# Patient Record
Sex: FEMALE | Race: WHITE | NOT HISPANIC OR LATINO
[De-identification: names, ages, dates, MRNs, and addresses within clinical notes are randomized per-mention and may not be internally consistent; named-entity substitution may affect disease eponyms.]

---

## 2019-01-08 ENCOUNTER — APPOINTMENT (OUTPATIENT)
Dept: SPINE | Facility: CLINIC | Age: 19
End: 2019-01-08
Payer: COMMERCIAL

## 2019-01-08 VITALS — HEIGHT: 61 IN | BODY MASS INDEX: 39.27 KG/M2 | WEIGHT: 208 LBS

## 2019-01-08 DIAGNOSIS — Z80.3 FAMILY HISTORY OF MALIGNANT NEOPLASM OF BREAST: ICD-10-CM

## 2019-01-08 PROCEDURE — 99212 OFFICE O/P EST SF 10 MIN: CPT

## 2019-01-08 RX ORDER — LURASIDONE HYDROCHLORIDE 20 MG/1
20 TABLET, FILM COATED ORAL
Refills: 0 | Status: ACTIVE | COMMUNITY

## 2019-01-18 ENCOUNTER — OUTPATIENT (OUTPATIENT)
Dept: OUTPATIENT SERVICES | Facility: HOSPITAL | Age: 19
LOS: 1 days | End: 2019-01-18
Payer: COMMERCIAL

## 2019-01-18 VITALS
TEMPERATURE: 97 F | WEIGHT: 195.11 LBS | HEART RATE: 95 BPM | OXYGEN SATURATION: 97 % | DIASTOLIC BLOOD PRESSURE: 80 MMHG | HEIGHT: 61.25 IN | RESPIRATION RATE: 16 BRPM | SYSTOLIC BLOOD PRESSURE: 113 MMHG

## 2019-01-18 DIAGNOSIS — Z98.89 OTHER SPECIFIED POSTPROCEDURAL STATES: Chronic | ICD-10-CM

## 2019-01-18 DIAGNOSIS — G90.529 COMPLEX REGIONAL PAIN SYNDROME I OF UNSPECIFIED LOWER LIMB: ICD-10-CM

## 2019-01-18 DIAGNOSIS — G90.522 COMPLEX REGIONAL PAIN SYNDROME I OF LEFT LOWER LIMB: ICD-10-CM

## 2019-01-18 DIAGNOSIS — F84.5 ASPERGER'S SYNDROME: ICD-10-CM

## 2019-01-18 DIAGNOSIS — K21.9 GASTRO-ESOPHAGEAL REFLUX DISEASE WITHOUT ESOPHAGITIS: ICD-10-CM

## 2019-01-18 DIAGNOSIS — Z01.818 ENCOUNTER FOR OTHER PREPROCEDURAL EXAMINATION: ICD-10-CM

## 2019-01-18 DIAGNOSIS — Z90.49 ACQUIRED ABSENCE OF OTHER SPECIFIED PARTS OF DIGESTIVE TRACT: Chronic | ICD-10-CM

## 2019-01-18 LAB
ANION GAP SERPL CALC-SCNC: 17 MMOL/L — SIGNIFICANT CHANGE UP (ref 5–17)
BUN SERPL-MCNC: 10 MG/DL — SIGNIFICANT CHANGE UP (ref 7–23)
CALCIUM SERPL-MCNC: 9.8 MG/DL — SIGNIFICANT CHANGE UP (ref 8.4–10.5)
CHLORIDE SERPL-SCNC: 98 MMOL/L — SIGNIFICANT CHANGE UP (ref 96–108)
CO2 SERPL-SCNC: 21 MMOL/L — LOW (ref 22–31)
CREAT SERPL-MCNC: 0.76 MG/DL — SIGNIFICANT CHANGE UP (ref 0.5–1.3)
GLUCOSE SERPL-MCNC: 73 MG/DL — SIGNIFICANT CHANGE UP (ref 70–99)
HCT VFR BLD CALC: 38.8 % — SIGNIFICANT CHANGE UP (ref 34.5–45)
HGB BLD-MCNC: 12.3 G/DL — SIGNIFICANT CHANGE UP (ref 11.5–15.5)
MCHC RBC-ENTMCNC: 26.5 PG — LOW (ref 27–34)
MCHC RBC-ENTMCNC: 31.7 GM/DL — LOW (ref 32–36)
MCV RBC AUTO: 83.6 FL — SIGNIFICANT CHANGE UP (ref 80–100)
PLATELET # BLD AUTO: 451 K/UL — HIGH (ref 150–400)
POTASSIUM SERPL-MCNC: 4.1 MMOL/L — SIGNIFICANT CHANGE UP (ref 3.5–5.3)
POTASSIUM SERPL-SCNC: 4.1 MMOL/L — SIGNIFICANT CHANGE UP (ref 3.5–5.3)
RBC # BLD: 4.64 M/UL — SIGNIFICANT CHANGE UP (ref 3.8–5.2)
RBC # FLD: 14.6 % — HIGH (ref 10.3–14.5)
SODIUM SERPL-SCNC: 136 MMOL/L — SIGNIFICANT CHANGE UP (ref 135–145)
WBC # BLD: 14.89 K/UL — HIGH (ref 3.8–10.5)
WBC # FLD AUTO: 14.89 K/UL — HIGH (ref 3.8–10.5)

## 2019-01-18 PROCEDURE — 80048 BASIC METABOLIC PNL TOTAL CA: CPT

## 2019-01-18 PROCEDURE — 85027 COMPLETE CBC AUTOMATED: CPT

## 2019-01-18 PROCEDURE — G0463: CPT

## 2019-01-18 RX ORDER — SODIUM CHLORIDE 9 MG/ML
3 INJECTION INTRAMUSCULAR; INTRAVENOUS; SUBCUTANEOUS EVERY 8 HOURS
Qty: 0 | Refills: 0 | Status: DISCONTINUED | OUTPATIENT
Start: 2019-01-25 | End: 2019-01-25

## 2019-01-18 RX ORDER — CEFAZOLIN SODIUM 1 G
2000 VIAL (EA) INJECTION ONCE
Qty: 0 | Refills: 0 | Status: DISCONTINUED | OUTPATIENT
Start: 2019-01-25 | End: 2019-02-09

## 2019-01-18 RX ORDER — LIDOCAINE HCL 20 MG/ML
0.2 VIAL (ML) INJECTION ONCE
Qty: 0 | Refills: 0 | Status: DISCONTINUED | OUTPATIENT
Start: 2019-01-25 | End: 2019-01-25

## 2019-01-18 NOTE — H&P PST ADULT - PSH
S/P foot surgery  LEFT  S/P insertion of spinal cord stimulator  2015  S/P tonsillectomy and adenoidectomy

## 2019-01-18 NOTE — H&P PST ADULT - PMH
ADD (attention deficit disorder)    ADHD (Attention Deficit Hyperactivity Disorder)    Ankle fracture  congenital  left ankle  staller Walden fracture  Asperger's syndrome    Crohn disease    Depression    Neuroma    OCD (Obsessive Compulsive Disorder)    RSD lower limb, left

## 2019-01-18 NOTE — H&P PST ADULT - HEALTH CARE MAINTENANCE
Has not received flu vaccine 2018-19 season  endoscopy/colonoscopy twice a year 2/2 chron's Has not received flu vaccine 2018-19 season  endoscopy/colonoscopy twice a year 2/2 chron' s

## 2019-01-18 NOTE — H&P PST ADULT - PROBLEM SELECTOR PLAN 1
Spinal Stimulator Battery Replacement  Pre-op education, including Chlorhexidine soap, provided - all questions answered

## 2019-01-18 NOTE — H&P PST ADULT - NEGATIVE NEUROLOGICAL SYMPTOMS
no loss of sensation/no difficulty walking/no confusion/no headache/no vertigo no syncope/no vertigo/no loss of sensation/no confusion/no difficulty walking/no tremors/no headache/no generalized seizures

## 2019-01-18 NOTE — H&P PST ADULT - HISTORY OF PRESENT ILLNESS
CRPS's. 2015, replacement 2016, first battery change 17 yo female, PMH Asperger's syndrome, OCD, ADD, Lupus, PCOS, Chron' s disease, CRPS with chronic left and right  foot and ankle pain s/p spinal cord stimulator placement 2016, requiring revision 2016, she has been doing well with pain relief but having some trouble recharging battery and returns to PST for Spinal Stimulator Battery replacement on 1/25/19. Pt. denies recent fever, chills, chest pain, SOB, changes in bowel/urinary habits.

## 2019-01-18 NOTE — H&P PST ADULT - NSANTHOSAYNRD_GEN_A_CORE
No. TOR screening performed.  STOP BANG Legend: 0-2 = LOW Risk; 3-4 = INTERMEDIATE Risk; 5-8 = HIGH Risk

## 2019-01-18 NOTE — H&P PST ADULT - NEGATIVE ENMT SYMPTOMS
no ear pain/no nasal congestion/no nasal obstruction/no post-nasal discharge/no tinnitus/no vertigo/no nasal discharge

## 2019-01-24 ENCOUNTER — TRANSCRIPTION ENCOUNTER (OUTPATIENT)
Age: 19
End: 2019-01-24

## 2019-01-25 ENCOUNTER — OUTPATIENT (OUTPATIENT)
Dept: OUTPATIENT SERVICES | Facility: HOSPITAL | Age: 19
LOS: 1 days | End: 2019-01-25
Payer: COMMERCIAL

## 2019-01-25 ENCOUNTER — APPOINTMENT (OUTPATIENT)
Dept: SPINE | Facility: HOSPITAL | Age: 19
End: 2019-01-25

## 2019-01-25 VITALS
WEIGHT: 207.01 LBS | OXYGEN SATURATION: 96 % | HEIGHT: 61 IN | RESPIRATION RATE: 16 BRPM | HEART RATE: 92 BPM | SYSTOLIC BLOOD PRESSURE: 120 MMHG | TEMPERATURE: 98 F | DIASTOLIC BLOOD PRESSURE: 81 MMHG

## 2019-01-25 VITALS
HEART RATE: 94 BPM | OXYGEN SATURATION: 94 % | SYSTOLIC BLOOD PRESSURE: 121 MMHG | DIASTOLIC BLOOD PRESSURE: 74 MMHG | RESPIRATION RATE: 16 BRPM

## 2019-01-25 DIAGNOSIS — Z98.89 OTHER SPECIFIED POSTPROCEDURAL STATES: Chronic | ICD-10-CM

## 2019-01-25 DIAGNOSIS — G90.522 COMPLEX REGIONAL PAIN SYNDROME I OF LEFT LOWER LIMB: ICD-10-CM

## 2019-01-25 DIAGNOSIS — Z90.49 ACQUIRED ABSENCE OF OTHER SPECIFIED PARTS OF DIGESTIVE TRACT: Chronic | ICD-10-CM

## 2019-01-25 DIAGNOSIS — Z01.818 ENCOUNTER FOR OTHER PREPROCEDURAL EXAMINATION: ICD-10-CM

## 2019-01-25 LAB — HCG UR QL: NEGATIVE — SIGNIFICANT CHANGE UP

## 2019-01-25 PROCEDURE — C1787: CPT

## 2019-01-25 PROCEDURE — 81025 URINE PREGNANCY TEST: CPT

## 2019-01-25 PROCEDURE — C1820: CPT

## 2019-01-25 PROCEDURE — 95972 ALYS CPLX SP/PN NPGT W/PRGRM: CPT | Mod: 59

## 2019-01-25 PROCEDURE — 63685 INS/RPLC SPI NPG/RCVR POCKET: CPT

## 2019-01-25 PROCEDURE — 63688 REV/RMV IMP SP NPG/R DTCH CN: CPT

## 2019-01-25 RX ORDER — CEPHALEXIN 500 MG
1 CAPSULE ORAL
Qty: 1 | Refills: 0
Start: 2019-01-25

## 2019-01-25 RX ORDER — OXYCODONE AND ACETAMINOPHEN 5; 325 MG/1; MG/1
1 TABLET ORAL EVERY 4 HOURS
Qty: 0 | Refills: 0 | Status: DISCONTINUED | OUTPATIENT
Start: 2019-01-25 | End: 2019-01-25

## 2019-01-25 RX ORDER — OXYCODONE HYDROCHLORIDE 5 MG/1
1 TABLET ORAL
Qty: 0 | Refills: 0 | DISCHARGE
Start: 2019-01-25 | End: 2019-01-28

## 2019-01-25 RX ORDER — DEXTROSE MONOHYDRATE, SODIUM CHLORIDE, AND POTASSIUM CHLORIDE 50; .745; 4.5 G/1000ML; G/1000ML; G/1000ML
1000 INJECTION, SOLUTION INTRAVENOUS
Qty: 0 | Refills: 0 | Status: DISCONTINUED | OUTPATIENT
Start: 2019-01-25 | End: 2019-02-09

## 2019-01-25 RX ORDER — ACETAMINOPHEN 500 MG
650 TABLET ORAL EVERY 6 HOURS
Qty: 0 | Refills: 0 | Status: DISCONTINUED | OUTPATIENT
Start: 2019-01-25 | End: 2019-02-09

## 2019-01-25 RX ORDER — OXYCODONE HYDROCHLORIDE 5 MG/1
1 TABLET ORAL
Qty: 16 | Refills: 0
Start: 2019-01-25 | End: 2019-01-28

## 2019-01-25 RX ORDER — OXYCODONE HYDROCHLORIDE 5 MG/1
2 TABLET ORAL
Qty: 0 | Refills: 0 | DISCHARGE
Start: 2019-01-25 | End: 2019-01-28

## 2019-01-25 RX ORDER — CEFAZOLIN SODIUM 1 G
2000 VIAL (EA) INJECTION EVERY 8 HOURS
Qty: 0 | Refills: 0 | Status: DISCONTINUED | OUTPATIENT
Start: 2019-01-25 | End: 2019-01-25

## 2019-01-25 RX ORDER — ONDANSETRON 8 MG/1
4 TABLET, FILM COATED ORAL ONCE
Qty: 0 | Refills: 0 | Status: DISCONTINUED | OUTPATIENT
Start: 2019-01-25 | End: 2019-01-25

## 2019-01-25 RX ORDER — OXYCODONE AND ACETAMINOPHEN 5; 325 MG/1; MG/1
2 TABLET ORAL EVERY 6 HOURS
Qty: 0 | Refills: 0 | Status: DISCONTINUED | OUTPATIENT
Start: 2019-01-25 | End: 2019-01-25

## 2019-01-25 RX ADMIN — SODIUM CHLORIDE 3 MILLILITER(S): 9 INJECTION INTRAMUSCULAR; INTRAVENOUS; SUBCUTANEOUS at 06:38

## 2019-01-25 NOTE — BRIEF OPERATIVE NOTE - PROCEDURE
<<-----Click on this checkbox to enter Procedure Replacement, battery, neurostimulator, spinal cord  01/25/2019    Active  [id number]

## 2019-01-25 NOTE — ASU DISCHARGE PLAN (ADULT/PEDIATRIC). - MEDICATION SUMMARY - MEDICATIONS TO TAKE
I will START or STAY ON the medications listed below when I get home from the hospital:    spironolactone 100 mg oral tablet  -- 1 tab(s) by mouth 2 times a day  -- Indication: For Home med    oxyCODONE 5 mg oral capsule  -- 1 cap(s) by mouth 4 times a day MDD:4 caps a day  -- Caution federal law prohibits the transfer of this drug to any person other  than the person for whom it was prescribed.  It is very important that you take or use this exactly as directed.  Do not skip doses or discontinue unless directed by your doctor.  May cause drowsiness.  Alcohol may intensify this effect.  Use care when operating dangerous machinery.  This prescription cannot be refilled.  Using more of this medication than prescribed may cause serious breathing problems.    -- Indication: For Pain    clonazePAM 2 mg oral tablet  -- 1 tab(s) by mouth 2 times a day  -- Indication: For Anxiety    DULoxetine  -- 90 milligram(s) by mouth once a day  -- Indication: For Depression    metFORMIN  -- 1200 milligram(s) by mouth 2 times a day, hold 24 hours pre-op  -- Indication: For Diabetes    Latuda 60 mg oral tablet  -- 1 tab(s) by mouth once a day (at bedtime)  -- Indication: For Psychiatric    methylphenidate 72 mg/24 hr oral tablet, extended release  -- 1 tab(s) by mouth once a day (in the morning)  -- Indication: For Stimulant    hyoscyamine 0.375 mg oral tablet, extended release  -- 1 tab(s) by mouth every 12 hours  -- Indication: For IBS    Humira  -- subcutaneous once a week on Fridays  -- Indication: For Home med    pantoprazole 40 mg oral delayed release tablet  -- 1 tab(s) by mouth once a day  -- Indication: For GERD    Junel 1/20 oral tablet  -- 1 tab(s) by mouth once a day  -- Indication: For Home med    Vitamin D3  -- 93002 unit(s) by mouth once a week  -- Indication: For Home med

## 2019-01-25 NOTE — BRIEF OPERATIVE NOTE - POST-OP DX
Complex regional pain syndrome type 1, affecting unspecified site  01/25/2019    Active  Zaheer Gong

## 2019-01-28 PROBLEM — M32.9 SYSTEMIC LUPUS ERYTHEMATOSUS, UNSPECIFIED: Chronic | Status: ACTIVE | Noted: 2019-01-18

## 2019-01-28 PROBLEM — G90.529 COMPLEX REGIONAL PAIN SYNDROME I OF UNSPECIFIED LOWER LIMB: Chronic | Status: ACTIVE | Noted: 2019-01-18

## 2019-02-01 ENCOUNTER — APPOINTMENT (OUTPATIENT)
Dept: SPINE | Facility: CLINIC | Age: 19
End: 2019-02-01
Payer: COMMERCIAL

## 2019-02-01 VITALS
SYSTOLIC BLOOD PRESSURE: 119 MMHG | HEIGHT: 61 IN | WEIGHT: 197 LBS | HEART RATE: 130 BPM | OXYGEN SATURATION: 99 % | DIASTOLIC BLOOD PRESSURE: 88 MMHG | RESPIRATION RATE: 15 BRPM | BODY MASS INDEX: 37.19 KG/M2

## 2019-02-01 PROCEDURE — 99024 POSTOP FOLLOW-UP VISIT: CPT

## 2019-02-01 RX ORDER — NORETHINDRONE ACETATE AND ETHINYL ESTRADIOL 1; 20 MG/1; UG/1
1-20 TABLET ORAL
Qty: 84 | Refills: 0 | Status: COMPLETED | COMMUNITY
Start: 2018-09-19

## 2019-02-01 RX ORDER — METFORMIN HYDROCHLORIDE 1000 MG/1
1000 TABLET, EXTENDED RELEASE ORAL
Qty: 60 | Refills: 0 | Status: COMPLETED | COMMUNITY
Start: 2018-12-30

## 2019-02-01 RX ORDER — LURASIDONE HYDROCHLORIDE 40 MG/1
40 TABLET, FILM COATED ORAL
Qty: 30 | Refills: 0 | Status: COMPLETED | COMMUNITY
Start: 2019-01-29

## 2019-02-01 RX ORDER — DULOXETINE HYDROCHLORIDE 60 MG/1
60 CAPSULE, DELAYED RELEASE PELLETS ORAL
Qty: 30 | Refills: 0 | Status: ACTIVE | COMMUNITY
Start: 2019-01-11

## 2019-02-01 RX ORDER — ERGOCALCIFEROL 1.25 MG/1
1.25 MG CAPSULE, LIQUID FILLED ORAL
Qty: 5 | Refills: 0 | Status: ACTIVE | COMMUNITY
Start: 2018-09-09

## 2019-02-01 RX ORDER — GUAIFENESIN AND CODEINE PHOSPHATE 10; 100 MG/5ML; MG/5ML
100-10 SOLUTION ORAL
Qty: 100 | Refills: 0 | Status: COMPLETED | COMMUNITY
Start: 2018-10-05

## 2019-02-01 RX ORDER — CEPHALEXIN 500 MG/1
500 CAPSULE ORAL
Qty: 1 | Refills: 0 | Status: COMPLETED | COMMUNITY
Start: 2019-01-25

## 2019-02-01 RX ORDER — SILVER SULFADIAZINE 10 MG/G
1 CREAM TOPICAL
Qty: 50 | Refills: 0 | Status: ACTIVE | COMMUNITY
Start: 2019-01-18

## 2019-02-01 RX ORDER — HYOSCYAMINE SULFATE 0.38 MG/1
0.38 TABLET, EXTENDED RELEASE ORAL
Qty: 60 | Refills: 0 | Status: ACTIVE | COMMUNITY
Start: 2019-01-10

## 2019-02-01 RX ORDER — OXYCODONE 5 MG/1
5 TABLET ORAL
Qty: 16 | Refills: 0 | Status: COMPLETED | COMMUNITY
Start: 2019-01-25

## 2019-02-01 RX ORDER — AZITHROMYCIN 250 MG/1
250 TABLET, FILM COATED ORAL
Qty: 6 | Refills: 0 | Status: COMPLETED | COMMUNITY
Start: 2018-10-26

## 2019-02-01 RX ORDER — TRIAMCINOLONE ACETONIDE 55 UG/1
55 SOLUTION/ DROPS OPHTHALMIC
Qty: 17 | Refills: 0 | Status: COMPLETED | COMMUNITY
Start: 2018-05-26

## 2019-02-01 RX ORDER — CHOLECALCIFEROL (VITAMIN D3) 125 MCG
TABLET ORAL
Refills: 0 | Status: DISCONTINUED | COMMUNITY
End: 2019-02-01

## 2019-02-01 RX ORDER — ADALIMUMAB 40MG/0.4ML
40 KIT SUBCUTANEOUS
Qty: 4 | Refills: 0 | Status: COMPLETED | COMMUNITY
Start: 2019-01-17

## 2019-02-11 ENCOUNTER — APPOINTMENT (OUTPATIENT)
Dept: SPINE | Facility: CLINIC | Age: 19
End: 2019-02-11
Payer: COMMERCIAL

## 2019-02-11 VITALS
HEIGHT: 61 IN | DIASTOLIC BLOOD PRESSURE: 84 MMHG | BODY MASS INDEX: 37.19 KG/M2 | TEMPERATURE: 98.3 F | SYSTOLIC BLOOD PRESSURE: 122 MMHG | WEIGHT: 197 LBS

## 2019-02-11 PROCEDURE — 99211 OFF/OP EST MAY X REQ PHY/QHP: CPT

## 2019-02-14 ENCOUNTER — OTHER (OUTPATIENT)
Age: 19
End: 2019-02-14

## 2019-11-19 ENCOUNTER — APPOINTMENT (OUTPATIENT)
Dept: SPINE | Facility: CLINIC | Age: 19
End: 2019-11-19
Payer: COMMERCIAL

## 2019-11-19 VITALS — SYSTOLIC BLOOD PRESSURE: 130 MMHG | DIASTOLIC BLOOD PRESSURE: 90 MMHG | HEART RATE: 106 BPM | TEMPERATURE: 98.1 F

## 2019-11-19 PROCEDURE — 99213 OFFICE O/P EST LOW 20 MIN: CPT

## 2019-12-06 ENCOUNTER — OUTPATIENT (OUTPATIENT)
Dept: OUTPATIENT SERVICES | Facility: HOSPITAL | Age: 19
LOS: 1 days | End: 2019-12-06
Payer: COMMERCIAL

## 2019-12-06 VITALS
SYSTOLIC BLOOD PRESSURE: 130 MMHG | HEIGHT: 64 IN | RESPIRATION RATE: 15 BRPM | TEMPERATURE: 99 F | HEART RATE: 111 BPM | OXYGEN SATURATION: 95 % | DIASTOLIC BLOOD PRESSURE: 93 MMHG | WEIGHT: 203.05 LBS

## 2019-12-06 DIAGNOSIS — Z98.89 OTHER SPECIFIED POSTPROCEDURAL STATES: Chronic | ICD-10-CM

## 2019-12-06 DIAGNOSIS — Z90.49 ACQUIRED ABSENCE OF OTHER SPECIFIED PARTS OF DIGESTIVE TRACT: Chronic | ICD-10-CM

## 2019-12-06 DIAGNOSIS — G90.519 COMPLEX REGIONAL PAIN SYNDROME I OF UNSPECIFIED UPPER LIMB: ICD-10-CM

## 2019-12-06 DIAGNOSIS — Z01.818 ENCOUNTER FOR OTHER PREPROCEDURAL EXAMINATION: ICD-10-CM

## 2019-12-06 DIAGNOSIS — G90.529 COMPLEX REGIONAL PAIN SYNDROME I OF UNSPECIFIED LOWER LIMB: ICD-10-CM

## 2019-12-06 DIAGNOSIS — G90.522 COMPLEX REGIONAL PAIN SYNDROME I OF LEFT LOWER LIMB: ICD-10-CM

## 2019-12-06 LAB
ANION GAP SERPL CALC-SCNC: 15 MMOL/L — SIGNIFICANT CHANGE UP (ref 5–17)
BUN SERPL-MCNC: 8 MG/DL — SIGNIFICANT CHANGE UP (ref 7–23)
CALCIUM SERPL-MCNC: 10.2 MG/DL — SIGNIFICANT CHANGE UP (ref 8.4–10.5)
CHLORIDE SERPL-SCNC: 99 MMOL/L — SIGNIFICANT CHANGE UP (ref 96–108)
CO2 SERPL-SCNC: 21 MMOL/L — LOW (ref 22–31)
CREAT SERPL-MCNC: 0.64 MG/DL — SIGNIFICANT CHANGE UP (ref 0.5–1.3)
GLUCOSE SERPL-MCNC: 111 MG/DL — HIGH (ref 70–99)
HCT VFR BLD CALC: 40.6 % — SIGNIFICANT CHANGE UP (ref 34.5–45)
HGB BLD-MCNC: 12.5 G/DL — SIGNIFICANT CHANGE UP (ref 11.5–15.5)
MCHC RBC-ENTMCNC: 24.8 PG — LOW (ref 27–34)
MCHC RBC-ENTMCNC: 30.8 GM/DL — LOW (ref 32–36)
MCV RBC AUTO: 80.4 FL — SIGNIFICANT CHANGE UP (ref 80–100)
PLATELET # BLD AUTO: 575 K/UL — HIGH (ref 150–400)
POTASSIUM SERPL-MCNC: 4.1 MMOL/L — SIGNIFICANT CHANGE UP (ref 3.5–5.3)
POTASSIUM SERPL-SCNC: 4.1 MMOL/L — SIGNIFICANT CHANGE UP (ref 3.5–5.3)
RBC # BLD: 5.05 M/UL — SIGNIFICANT CHANGE UP (ref 3.8–5.2)
RBC # FLD: 15.6 % — HIGH (ref 10.3–14.5)
SODIUM SERPL-SCNC: 135 MMOL/L — SIGNIFICANT CHANGE UP (ref 135–145)
WBC # BLD: 15.92 K/UL — HIGH (ref 3.8–10.5)
WBC # FLD AUTO: 15.92 K/UL — HIGH (ref 3.8–10.5)

## 2019-12-06 PROCEDURE — G0463: CPT

## 2019-12-06 RX ORDER — LURASIDONE HYDROCHLORIDE 40 MG/1
1 TABLET ORAL
Qty: 0 | Refills: 0 | DISCHARGE

## 2019-12-06 RX ORDER — CEFAZOLIN SODIUM 1 G
2000 VIAL (EA) INJECTION ONCE
Refills: 0 | Status: DISCONTINUED | OUTPATIENT
Start: 2019-12-20 | End: 2020-01-05

## 2019-12-06 RX ORDER — CLONAZEPAM 1 MG
1 TABLET ORAL
Qty: 0 | Refills: 0 | DISCHARGE

## 2019-12-06 RX ORDER — METFORMIN HYDROCHLORIDE 850 MG/1
1200 TABLET ORAL
Qty: 0 | Refills: 0 | DISCHARGE

## 2019-12-06 RX ORDER — CHOLECALCIFEROL (VITAMIN D3) 125 MCG
1 CAPSULE ORAL
Qty: 0 | Refills: 0 | DISCHARGE

## 2019-12-06 RX ORDER — CHOLECALCIFEROL (VITAMIN D3) 125 MCG
50000 CAPSULE ORAL
Qty: 0 | Refills: 0 | DISCHARGE

## 2019-12-06 NOTE — H&P PST ADULT - NSICDXPASTSURGICALHX_GEN_ALL_CORE_FT
PAST SURGICAL HISTORY:  S/P cholecystectomy 2018    S/P foot surgery LEFT    S/P insertion of spinal cord stimulator 2015, revision 2016    S/P tonsillectomy and adenoidectomy PAST SURGICAL HISTORY:  S/P cholecystectomy 2018    S/P foot surgery LEFT    S/P insertion of spinal cord stimulator 2015, revision 2016, battery change 2019    S/P tonsillectomy and adenoidectomy

## 2019-12-06 NOTE — H&P PST ADULT - NSICDXPASTMEDICALHX_GEN_ALL_CORE_FT
PAST MEDICAL HISTORY:  ADD (attention deficit disorder)     ADHD (Attention Deficit Hyperactivity Disorder)     Ankle fracture congenital  left ankle  Staller Walden fracture    Asperger's syndrome     Crohn disease     CRPS (complex regional pain syndrome), lower limb     Depression     Lupus (systemic lupus erythematosus)     Neuroma     OCD (Obsessive Compulsive Disorder)     RSD lower limb, left PAST MEDICAL HISTORY:  ADD (attention deficit disorder)     ADHD (Attention Deficit Hyperactivity Disorder)     Ankle fracture congenital  left ankle  Staller Walden fracture    Asperger's syndrome     Crohn disease     CRPS (complex regional pain syndrome), lower limb     Depression     Lupus (systemic lupus erythematosus)     Neuroma     OCD (Obsessive Compulsive Disorder)     PCOS (polycystic ovarian syndrome) on metformin    PNA (pneumonia) 2018 w/ pneumothorax    RSD lower limb, left

## 2019-12-06 NOTE — H&P PST ADULT - CONSTITUTIONAL DETAILS
no distress/well-nourished/well-developed/well-groomed obese/well-developed/well-groomed/no distress

## 2019-12-06 NOTE — H&P PST ADULT - HISTORY OF PRESENT ILLNESS
18 y/o female, PMH Asperger's syndrome, OCD, ADD, Lupus, PCOS, Crohn' s disease, CRPS with chronic left and right  foot and ankle pain s/p spinal cord stimulator placement 2016, requiring revision 2016, she has been doing well with pain relief but having some trouble recharging battery and returns to PST for Spinal Stimulator Battery replacement on 1/25/19. Pt. denies recent fever, chills, chest pain, SOB, changes in bowel/urinary habits.    pain left hand, forearm  right forearm and right pinky  -  sharp pain with touch 20 y/o female, PMH Asperger's syndrome, OCD, ADD, Lupus, PCOS, Crohn' s disease, CRPS with chronic left and right foot and ankle pain s/p spinal cord stimulator placement 2016, requiring revision 2016 and battery change in January 2019.  She has been doing well with LE pain relief but has been experiencing frequent sharp "pins and needle" pain to left hand and forearm and right 5th finger and forearm. Pt. denies recent fever, chills, chest pain, SOB, changes in bowel/urinary habits. Presents for stage 1, percutaneous cervical spine cord stimulator trial.

## 2019-12-06 NOTE — H&P PST ADULT - NEGATIVE ENMT SYMPTOMS
no vertigo/no nasal congestion/no ear pain/no nasal obstruction/no post-nasal discharge/no tinnitus/no nasal discharge

## 2019-12-06 NOTE — H&P PST ADULT - OTHER CARE PROVIDERS
Dr. Luis Bertrand, pediatric GI, [phone] Dr. Luis Bertrand, pediatric GI, [phone]     Dr. Babar Arzola (pain mgmt)

## 2019-12-06 NOTE — H&P PST ADULT - NSICDXPROBLEM_GEN_ALL_CORE_FT
PROBLEM DIAGNOSES  Problem: CRPS (complex regional pain syndrome), upper limb  Assessment and Plan: Scheduled cervical SCS  CBC, BMP ordered  c/w pain meds as prescribed   will stop Metformin 12/19 in the AM

## 2019-12-06 NOTE — H&P PST ADULT - NEGATIVE NEUROLOGICAL SYMPTOMS
no generalized seizures/no syncope/no tremors/no vertigo/no headache/no confusion/no loss of sensation/no difficulty walking

## 2019-12-07 LAB — HBA1C BLD-MCNC: 5.8 % — HIGH (ref 4–5.6)

## 2019-12-19 ENCOUNTER — TRANSCRIPTION ENCOUNTER (OUTPATIENT)
Age: 19
End: 2019-12-19

## 2019-12-20 ENCOUNTER — OUTPATIENT (OUTPATIENT)
Dept: OUTPATIENT SERVICES | Facility: HOSPITAL | Age: 19
LOS: 1 days | End: 2019-12-20
Payer: COMMERCIAL

## 2019-12-20 ENCOUNTER — APPOINTMENT (OUTPATIENT)
Dept: SPINE | Facility: HOSPITAL | Age: 19
End: 2019-12-20

## 2019-12-20 VITALS
RESPIRATION RATE: 14 BRPM | SYSTOLIC BLOOD PRESSURE: 102 MMHG | DIASTOLIC BLOOD PRESSURE: 63 MMHG | HEART RATE: 100 BPM | OXYGEN SATURATION: 95 %

## 2019-12-20 VITALS
RESPIRATION RATE: 18 BRPM | DIASTOLIC BLOOD PRESSURE: 74 MMHG | TEMPERATURE: 97 F | HEIGHT: 60 IN | SYSTOLIC BLOOD PRESSURE: 100 MMHG | OXYGEN SATURATION: 95 % | WEIGHT: 203.05 LBS | HEART RATE: 104 BPM

## 2019-12-20 DIAGNOSIS — Z98.89 OTHER SPECIFIED POSTPROCEDURAL STATES: Chronic | ICD-10-CM

## 2019-12-20 DIAGNOSIS — Z90.49 ACQUIRED ABSENCE OF OTHER SPECIFIED PARTS OF DIGESTIVE TRACT: Chronic | ICD-10-CM

## 2019-12-20 DIAGNOSIS — G90.522 COMPLEX REGIONAL PAIN SYNDROME I OF LEFT LOWER LIMB: ICD-10-CM

## 2019-12-20 LAB — HCG UR QL: NEGATIVE — SIGNIFICANT CHANGE UP

## 2019-12-20 PROCEDURE — 95972 ALYS CPLX SP/PN NPGT W/PRGRM: CPT | Mod: 59

## 2019-12-20 PROCEDURE — 63650 IMPLANT NEUROELECTRODES: CPT

## 2019-12-20 RX ORDER — DEXTROSE 50 % IN WATER 50 %
12.5 SYRINGE (ML) INTRAVENOUS ONCE
Refills: 0 | Status: DISCONTINUED | OUTPATIENT
Start: 2019-12-20 | End: 2020-01-05

## 2019-12-20 RX ORDER — SPIRONOLACTONE 25 MG/1
100 TABLET, FILM COATED ORAL
Refills: 0 | Status: DISCONTINUED | OUTPATIENT
Start: 2019-12-20 | End: 2020-01-05

## 2019-12-20 RX ORDER — INSULIN LISPRO 100/ML
VIAL (ML) SUBCUTANEOUS
Refills: 0 | Status: DISCONTINUED | OUTPATIENT
Start: 2019-12-20 | End: 2020-01-05

## 2019-12-20 RX ORDER — LIDOCAINE HCL 20 MG/ML
0.2 VIAL (ML) INJECTION ONCE
Refills: 0 | Status: DISCONTINUED | OUTPATIENT
Start: 2019-12-20 | End: 2019-12-20

## 2019-12-20 RX ORDER — CLONAZEPAM 1 MG
2 TABLET ORAL
Refills: 0 | Status: DISCONTINUED | OUTPATIENT
Start: 2019-12-20 | End: 2019-12-20

## 2019-12-20 RX ORDER — DEXTROSE 50 % IN WATER 50 %
15 SYRINGE (ML) INTRAVENOUS ONCE
Refills: 0 | Status: DISCONTINUED | OUTPATIENT
Start: 2019-12-20 | End: 2020-01-05

## 2019-12-20 RX ORDER — OXYCODONE AND ACETAMINOPHEN 5; 325 MG/1; MG/1
1 TABLET ORAL EVERY 6 HOURS
Refills: 0 | Status: DISCONTINUED | OUTPATIENT
Start: 2019-12-20 | End: 2019-12-20

## 2019-12-20 RX ORDER — PANTOPRAZOLE SODIUM 20 MG/1
1 TABLET, DELAYED RELEASE ORAL
Qty: 0 | Refills: 0 | DISCHARGE

## 2019-12-20 RX ORDER — GLUCAGON INJECTION, SOLUTION 0.5 MG/.1ML
1 INJECTION, SOLUTION SUBCUTANEOUS ONCE
Refills: 0 | Status: DISCONTINUED | OUTPATIENT
Start: 2019-12-20 | End: 2020-01-05

## 2019-12-20 RX ORDER — CEPHALEXIN 500 MG
1 CAPSULE ORAL
Qty: 6 | Refills: 0
Start: 2019-12-20 | End: 2019-12-22

## 2019-12-20 RX ORDER — CHLORHEXIDINE GLUCONATE 213 G/1000ML
1 SOLUTION TOPICAL ONCE
Refills: 0 | Status: DISCONTINUED | OUTPATIENT
Start: 2019-12-20 | End: 2019-12-20

## 2019-12-20 RX ORDER — DEXTROSE 50 % IN WATER 50 %
25 SYRINGE (ML) INTRAVENOUS ONCE
Refills: 0 | Status: DISCONTINUED | OUTPATIENT
Start: 2019-12-20 | End: 2020-01-05

## 2019-12-20 RX ORDER — INSULIN LISPRO 100/ML
VIAL (ML) SUBCUTANEOUS AT BEDTIME
Refills: 0 | Status: DISCONTINUED | OUTPATIENT
Start: 2019-12-20 | End: 2020-01-05

## 2019-12-20 RX ORDER — SODIUM CHLORIDE 9 MG/ML
1000 INJECTION, SOLUTION INTRAVENOUS
Refills: 0 | Status: DISCONTINUED | OUTPATIENT
Start: 2019-12-20 | End: 2020-01-05

## 2019-12-20 RX ORDER — SODIUM CHLORIDE 9 MG/ML
3 INJECTION INTRAMUSCULAR; INTRAVENOUS; SUBCUTANEOUS EVERY 8 HOURS
Refills: 0 | Status: DISCONTINUED | OUTPATIENT
Start: 2019-12-20 | End: 2019-12-20

## 2019-12-20 NOTE — ASU DISCHARGE PLAN (ADULT/PEDIATRIC) - CARE PROVIDER_API CALL
Ok Burt)  Neurological Surgery  [address] [place] [address], [address]  [place] [address]  Phone: [phone]  Fax: [phone]  Follow Up Time:

## 2019-12-20 NOTE — PRE-ANESTHESIA EVALUATION ADULT - NSANTHPMHFT_GEN_ALL_CORE
18 y/o female, PMH Asperger's syndrome, OCD, ADD, Lupus, PCOS, Crohn' s disease, CRPS with chronic left and right foot and ankle pain s/p spinal cord stimulator placement 2016, requiring revision 2016 and battery change in January 2019.  She has been doing well with LE pain relief but has been experiencing frequent sharp "pins and needle" pain to left hand and forearm and right 5th finger and forearm. Pt. denies recent fever, chills, chest pain, SOB, changes in bowel/urinary habits. Presents for stage 1, percutaneous cervical spine cord stimulator trial.

## 2019-12-20 NOTE — ASU DISCHARGE PLAN (ADULT/PEDIATRIC) - NURSING INSTRUCTIONS
If you experience bleeding from the site pain not relived by pain medication nausea vomiting abdominal distension unable to urinate call your doctor

## 2019-12-20 NOTE — ASU DISCHARGE PLAN (ADULT/PEDIATRIC) - ASU DC SPECIAL INSTRUCTIONSFT
1. Do not remove any dressing on top of the electrodes or the battery. Do not remove the battery from its villafana. Do not manipulate the electrodes exiting the mid back.   2. The electrodes and battery should be kept dry. If showering only the front of the body away from the electrodes and battery may be wet. Please consult the representative from the stimulator company regarding specific instructions.   3. Notify your surgeon if you notice increased redness, drainage or you notice incision area opening.   4. Return to ER immediately for high fevers, severe headache, vomiting, lethargy or weakness  5. Please call your neurosurgeon following discharge to make follow up appointment in 1 week after discharge unless otherwise specified. See contact information.  6. Prescription post operative medication has been sent to the pharmacy on file.   7. Ambulate as tolerate. Continue with all "activities of daily living." Avoid strenuous activity or lifting more than 10 pounds until cleared for additional activity at your follow up appointment.  8. Do not return to work or school until cleared by your neurosurgeon at your follow up visit unless specified to you during your hospital stay  9. Do not restart your ASA or anticoagulation/ anti platelets until you follow up with your neurosurgeon, or have gotten their approval.

## 2019-12-23 ENCOUNTER — OTHER (OUTPATIENT)
Age: 19
End: 2019-12-23

## 2019-12-23 PROBLEM — E28.2 POLYCYSTIC OVARIAN SYNDROME: Chronic | Status: ACTIVE | Noted: 2019-12-06

## 2019-12-23 PROBLEM — J18.9 PNEUMONIA, UNSPECIFIED ORGANISM: Chronic | Status: ACTIVE | Noted: 2019-12-06

## 2019-12-24 ENCOUNTER — APPOINTMENT (OUTPATIENT)
Dept: SPINE | Facility: CLINIC | Age: 19
End: 2019-12-24

## 2019-12-24 VITALS
DIASTOLIC BLOOD PRESSURE: 88 MMHG | HEIGHT: 61 IN | BODY MASS INDEX: 37.19 KG/M2 | SYSTOLIC BLOOD PRESSURE: 119 MMHG | WEIGHT: 197 LBS

## 2019-12-24 DIAGNOSIS — Z82.5 FAMILY HISTORY OF ASTHMA AND OTHER CHRONIC LOWER RESPIRATORY DISEASES: ICD-10-CM

## 2019-12-24 DIAGNOSIS — Z84.2 FAMILY HISTORY OF OTHER DISEASES OF THE GENITOURINARY SYSTEM: ICD-10-CM

## 2019-12-24 DIAGNOSIS — Z81.8 FAMILY HISTORY OF OTHER MENTAL AND BEHAVIORAL DISORDERS: ICD-10-CM

## 2019-12-24 DIAGNOSIS — Z82.49 FAMILY HISTORY OF ISCHEMIC HEART DISEASE AND OTHER DISEASES OF THE CIRCULATORY SYSTEM: ICD-10-CM

## 2019-12-24 NOTE — PHYSICAL EXAM
[General Appearance - Well Nourished] : well nourished [General Appearance - Alert] : alert [General Appearance - In No Acute Distress] : in no acute distress [Clean] : clean [General Appearance - Well Developed] : well developed [Healing Well] : healing well [Intact] : intact [Dry] : dry [Normal Skin] : normal [No Drainage] : without drainage [Impaired Insight] : insight and judgment were intact [Oriented To Time, Place, And Person] : oriented to person, place, and time [Affect] : the affect was normal [Person] : oriented to person [Mood] : the mood was normal [Place] : oriented to place [Short Term Intact] : short term memory intact [Time] : oriented to time [Remote Intact] : remote memory intact [Concentration Intact] : normal concentrating ability [Span Intact] : the attention span was normal [Registration Intact] : recent registration memory intact [Involuntary Movements] : no involuntary movements were seen [Visual Intact] : visual attention was ~T not ~L decreased [Outer Ear] : the ears and nose were normal in appearance [Sclera] : the sclera and conjunctiva were normal [No Visual Abnormalities] : no visible abnormalities [] : no respiratory distress [Neck Appearance] : the appearance of the neck was normal [Abnormal Walk] : normal gait [Skin Color & Pigmentation] : normal skin color and pigmentation [FreeTextEntry1] : thoracic region [FreeTextEntry6] : sutures and leads removed

## 2019-12-24 NOTE — HISTORY OF PRESENT ILLNESS
[FreeTextEntry1] : Ms. Cole is a 19 year old female well known to the office for a history of LE CRPS and in 2015 had a thoracic SCS for LE pain.  Her pain has been well controlled over the years  and most recently began suffering from neck pain with bilateral arm pain, worse on the left.  A cervical spine stimulator trial was initiated and she is here for lead removal.  After placement of the leads she was placed on Keflex and she developed a rash that was discontinued and the rash has resolved.   \par \par Today her mother and herself are reporting 100 % relief from the stimulator device.  She has been able to perform ADL's over the past week that she has not been able to conduct in years.  Improvement of her coordination has been evidence by the ability to feed herself and play video games over the last week.  In addition, the pain in her arms has decreased.   The device site is clean with a DSD and clear tegaderm dressing in place.   She continues to use Oxycodone 10 mg daily as needed.

## 2019-12-24 NOTE — ASSESSMENT
[FreeTextEntry1] : 18 yo female suffering from CRPS and prior SCS for LE pain and now developing neck pain with bilateral arm pain, worse on the left.  She reports 100% relief from the cervical spine stimulator trial that was placed on Dec 20, 2019 and was turned off today, December 24, 2019.  The leads were removed with ease and the site was unremarkable.  A DSD was placed at the puncture sites and  she was instructed to remove the dressing in 24 hours.    She may bathe in 48 hours and keep the site clean and dry.  If any s/s of infection occur she will contact the office prior to her surgical procedure for permanent placement of a cervical spine stimulator.    After surgery she will follow up accordingly.

## 2019-12-24 NOTE — REVIEW OF SYSTEMS
[Poor Coordination] : poor coordination [Negative] : Integumentary [de-identified] : chronic pain in all 4 extremities , neck pain

## 2019-12-24 NOTE — REASON FOR VISIT
[Parent] : parent [de-identified] : 12/20/2019 [de-identified] : Lead placement for a cervical spine stimulator trial

## 2019-12-27 ENCOUNTER — APPOINTMENT (OUTPATIENT)
Dept: SPINE | Facility: CLINIC | Age: 19
End: 2019-12-27

## 2020-01-10 ENCOUNTER — APPOINTMENT (OUTPATIENT)
Dept: SPINE | Facility: HOSPITAL | Age: 20
End: 2020-01-10

## 2020-01-10 ENCOUNTER — TRANSCRIPTION ENCOUNTER (OUTPATIENT)
Age: 20
End: 2020-01-10

## 2020-01-10 ENCOUNTER — OUTPATIENT (OUTPATIENT)
Dept: OUTPATIENT SERVICES | Facility: HOSPITAL | Age: 20
LOS: 1 days | Discharge: HOME | End: 2020-01-10
Payer: COMMERCIAL

## 2020-01-10 VITALS
RESPIRATION RATE: 18 BRPM | DIASTOLIC BLOOD PRESSURE: 74 MMHG | WEIGHT: 203.05 LBS | HEIGHT: 60 IN | SYSTOLIC BLOOD PRESSURE: 115 MMHG | OXYGEN SATURATION: 96 % | HEART RATE: 104 BPM | TEMPERATURE: 99 F

## 2020-01-10 VITALS
OXYGEN SATURATION: 95 % | HEART RATE: 95 BPM | RESPIRATION RATE: 16 BRPM | DIASTOLIC BLOOD PRESSURE: 68 MMHG | TEMPERATURE: 98 F | SYSTOLIC BLOOD PRESSURE: 115 MMHG

## 2020-01-10 DIAGNOSIS — Z90.49 ACQUIRED ABSENCE OF OTHER SPECIFIED PARTS OF DIGESTIVE TRACT: Chronic | ICD-10-CM

## 2020-01-10 DIAGNOSIS — Z98.89 OTHER SPECIFIED POSTPROCEDURAL STATES: Chronic | ICD-10-CM

## 2020-01-10 DIAGNOSIS — G90.522 COMPLEX REGIONAL PAIN SYNDROME I OF LEFT LOWER LIMB: ICD-10-CM

## 2020-01-10 LAB
GLUCOSE BLDC GLUCOMTR-MCNC: 110 MG/DL — HIGH (ref 70–99)
HCG UR QL: NEGATIVE — SIGNIFICANT CHANGE UP

## 2020-01-10 PROCEDURE — C1778: CPT

## 2020-01-10 PROCEDURE — C1889: CPT

## 2020-01-10 PROCEDURE — 63685 INS/RPLC SPI NPG/RCVR POCKET: CPT

## 2020-01-10 PROCEDURE — 82962 GLUCOSE BLOOD TEST: CPT

## 2020-01-10 PROCEDURE — C1820: CPT

## 2020-01-10 PROCEDURE — 63650 IMPLANT NEUROELECTRODES: CPT

## 2020-01-10 PROCEDURE — 76000 FLUOROSCOPY <1 HR PHYS/QHP: CPT

## 2020-01-10 PROCEDURE — C1787: CPT

## 2020-01-10 PROCEDURE — 81025 URINE PREGNANCY TEST: CPT

## 2020-01-10 PROCEDURE — 95972 ALYS CPLX SP/PN NPGT W/PRGRM: CPT | Mod: 59

## 2020-01-10 RX ORDER — DEXTROSE 50 % IN WATER 50 %
12.5 SYRINGE (ML) INTRAVENOUS ONCE
Refills: 0 | Status: DISCONTINUED | OUTPATIENT
Start: 2020-01-10 | End: 2020-02-02

## 2020-01-10 RX ORDER — METHYLPHENIDATE HCL 5 MG
1 TABLET ORAL
Qty: 0 | Refills: 0 | DISCHARGE

## 2020-01-10 RX ORDER — DEXTROSE 50 % IN WATER 50 %
25 SYRINGE (ML) INTRAVENOUS ONCE
Refills: 0 | Status: DISCONTINUED | OUTPATIENT
Start: 2020-01-10 | End: 2020-02-02

## 2020-01-10 RX ORDER — NORETHINDRONE AND ETHINYL ESTRADIOL 0.4-0.035
1 KIT ORAL
Qty: 0 | Refills: 0 | DISCHARGE

## 2020-01-10 RX ORDER — INSULIN LISPRO 100/ML
VIAL (ML) SUBCUTANEOUS
Refills: 0 | Status: DISCONTINUED | OUTPATIENT
Start: 2020-01-10 | End: 2020-02-02

## 2020-01-10 RX ORDER — SODIUM CHLORIDE 9 MG/ML
1000 INJECTION, SOLUTION INTRAVENOUS
Refills: 0 | Status: DISCONTINUED | OUTPATIENT
Start: 2020-01-10 | End: 2020-02-02

## 2020-01-10 RX ORDER — ONDANSETRON 8 MG/1
4 TABLET, FILM COATED ORAL ONCE
Refills: 0 | Status: DISCONTINUED | OUTPATIENT
Start: 2020-01-10 | End: 2020-01-10

## 2020-01-10 RX ORDER — SODIUM CHLORIDE 9 MG/ML
1000 INJECTION INTRAMUSCULAR; INTRAVENOUS; SUBCUTANEOUS
Refills: 0 | Status: DISCONTINUED | OUTPATIENT
Start: 2020-01-10 | End: 2020-02-02

## 2020-01-10 RX ORDER — METFORMIN HYDROCHLORIDE 850 MG/1
1000 TABLET ORAL
Qty: 0 | Refills: 0 | DISCHARGE

## 2020-01-10 RX ORDER — OXYCODONE HYDROCHLORIDE 5 MG/1
1 TABLET ORAL
Qty: 20 | Refills: 0
Start: 2020-01-10 | End: 2020-01-14

## 2020-01-10 RX ORDER — LURASIDONE HYDROCHLORIDE 40 MG/1
100 TABLET ORAL
Qty: 0 | Refills: 0 | DISCHARGE

## 2020-01-10 RX ORDER — PREGABALIN 225 MG/1
1 CAPSULE ORAL
Qty: 0 | Refills: 0 | DISCHARGE

## 2020-01-10 RX ORDER — CHOLECALCIFEROL (VITAMIN D3) 125 MCG
50000 CAPSULE ORAL
Qty: 0 | Refills: 0 | DISCHARGE

## 2020-01-10 RX ORDER — SPIRONOLACTONE 25 MG/1
1 TABLET, FILM COATED ORAL
Qty: 0 | Refills: 0 | DISCHARGE

## 2020-01-10 RX ORDER — DULOXETINE HYDROCHLORIDE 30 MG/1
90 CAPSULE, DELAYED RELEASE ORAL
Qty: 0 | Refills: 0 | DISCHARGE

## 2020-01-10 RX ORDER — HYDROMORPHONE HYDROCHLORIDE 2 MG/ML
1 INJECTION INTRAMUSCULAR; INTRAVENOUS; SUBCUTANEOUS
Refills: 0 | Status: DISCONTINUED | OUTPATIENT
Start: 2020-01-10 | End: 2020-01-10

## 2020-01-10 RX ORDER — HYDROMORPHONE HYDROCHLORIDE 2 MG/ML
0.5 INJECTION INTRAMUSCULAR; INTRAVENOUS; SUBCUTANEOUS
Refills: 0 | Status: DISCONTINUED | OUTPATIENT
Start: 2020-01-10 | End: 2020-01-10

## 2020-01-10 RX ORDER — GLUCAGON INJECTION, SOLUTION 0.5 MG/.1ML
1 INJECTION, SOLUTION SUBCUTANEOUS ONCE
Refills: 0 | Status: DISCONTINUED | OUTPATIENT
Start: 2020-01-10 | End: 2020-02-02

## 2020-01-10 RX ORDER — ADALIMUMAB 40MG/0.8ML
0 KIT SUBCUTANEOUS
Qty: 0 | Refills: 0 | DISCHARGE

## 2020-01-10 RX ORDER — HYOSCYAMINE SULFATE 0.13 MG
1 TABLET ORAL
Qty: 0 | Refills: 0 | DISCHARGE

## 2020-01-10 RX ORDER — OMEPRAZOLE 10 MG/1
1 CAPSULE, DELAYED RELEASE ORAL
Qty: 0 | Refills: 0 | DISCHARGE

## 2020-01-10 RX ORDER — SODIUM CHLORIDE 9 MG/ML
3 INJECTION INTRAMUSCULAR; INTRAVENOUS; SUBCUTANEOUS EVERY 8 HOURS
Refills: 0 | Status: DISCONTINUED | OUTPATIENT
Start: 2020-01-10 | End: 2020-01-10

## 2020-01-10 RX ORDER — DEXTROSE 50 % IN WATER 50 %
15 SYRINGE (ML) INTRAVENOUS ONCE
Refills: 0 | Status: DISCONTINUED | OUTPATIENT
Start: 2020-01-10 | End: 2020-02-02

## 2020-01-10 RX ORDER — OXYCODONE HYDROCHLORIDE 5 MG/1
1 TABLET ORAL
Qty: 0 | Refills: 0 | DISCHARGE

## 2020-01-10 RX ORDER — OXYCODONE HYDROCHLORIDE 5 MG/1
5 TABLET ORAL EVERY 4 HOURS
Refills: 0 | Status: DISCONTINUED | OUTPATIENT
Start: 2020-01-10 | End: 2020-01-10

## 2020-01-10 RX ORDER — OXYCODONE HYDROCHLORIDE 5 MG/1
1 TABLET ORAL
Qty: 16 | Refills: 0
Start: 2020-01-10 | End: 2020-01-13

## 2020-01-10 RX ORDER — INSULIN LISPRO 100/ML
VIAL (ML) SUBCUTANEOUS AT BEDTIME
Refills: 0 | Status: DISCONTINUED | OUTPATIENT
Start: 2020-01-10 | End: 2020-02-02

## 2020-01-10 RX ORDER — ACETAMINOPHEN 500 MG
650 TABLET ORAL EVERY 6 HOURS
Refills: 0 | Status: DISCONTINUED | OUTPATIENT
Start: 2020-01-10 | End: 2020-02-02

## 2020-01-10 RX ORDER — CLONAZEPAM 1 MG
1 TABLET ORAL
Qty: 0 | Refills: 0 | DISCHARGE

## 2020-01-10 RX ADMIN — HYDROMORPHONE HYDROCHLORIDE 0.5 MILLIGRAM(S): 2 INJECTION INTRAMUSCULAR; INTRAVENOUS; SUBCUTANEOUS at 16:09

## 2020-01-10 RX ADMIN — HYDROMORPHONE HYDROCHLORIDE 0.5 MILLIGRAM(S): 2 INJECTION INTRAMUSCULAR; INTRAVENOUS; SUBCUTANEOUS at 16:30

## 2020-01-10 NOTE — H&P ADULT - HISTORY OF PRESENT ILLNESS
20 y/o female, PMH Asperger's syndrome, OCD, ADD, Lupus, PCOS, Crohn' s disease, CRPS with chronic left and right foot and ankle pain s/p spinal cord stimulator placement 2016, requiring revision 2016 and battery change in January 2019.  She has been doing well with LE pain relief but has been experiencing frequent sharp "pins and needle" pain to left hand and forearm and right 5th finger and forearm. Pt. denies recent fever, chills, chest pain, SOB, changes in bowel/urinary habits. Presents for stage 1, percutaneous cervical spine cord stimulator trial.

## 2020-01-10 NOTE — ASU DISCHARGE PLAN (ADULT/PEDIATRIC) - CARE PROVIDER_API CALL
Ok Burt)  Neurological Surgery  [address] [place] [address], [address]  [place] [address]  Phone: [phone]  Fax: [phone]  Established Patient  Follow Up Time:

## 2020-01-10 NOTE — BRIEF OPERATIVE NOTE - NSICDXBRIEFPROCEDURE_GEN_ALL_CORE_FT
PROCEDURES:  Insertion, spinal pulse generator 10-Leland-2020 15:21:51  Chau Morillo  Implantation, percutaneous, epidural electrode 10-Leland-2020 15:18:53  Chau Morillo

## 2020-01-10 NOTE — ASU DISCHARGE PLAN (ADULT/PEDIATRIC) - CALL YOUR DOCTOR IF YOU HAVE ANY OF THE FOLLOWING:
Swelling that gets worse/Fever greater than (need to indicate Fahrenheit or Celsius)/Bleeding that does not stop/Wound/Surgical Site with redness, or foul smelling discharge or pus/Pain not relieved by Medications/Numbness, tingling, color or temperature change to extremity/Nausea and vomiting that does not stop/Unable to urinate

## 2020-01-10 NOTE — H&P ADULT - NSICDXPASTMEDICALHX_GEN_ALL_CORE_FT
PAST MEDICAL HISTORY:  ADD (attention deficit disorder)     ADHD (Attention Deficit Hyperactivity Disorder)     Ankle fracture congenital  left ankle  Staller Walden fracture    Asperger's syndrome     Crohn disease     CRPS (complex regional pain syndrome), lower limb     Depression     Lupus (systemic lupus erythematosus)     Neuroma     OCD (Obsessive Compulsive Disorder)     PCOS (polycystic ovarian syndrome) on metformin    PNA (pneumonia) 2018 w/ pneumothorax    RSD lower limb, left

## 2020-01-10 NOTE — H&P ADULT - NSICDXPASTSURGICALHX_GEN_ALL_CORE_FT
PAST SURGICAL HISTORY:  S/P cholecystectomy 2018    S/P foot surgery LEFT    S/P insertion of spinal cord stimulator 2015, revision 2016, battery change 2019    S/P tonsillectomy and adenoidectomy

## 2020-01-10 NOTE — DISCHARGE NOTE NURSING/CASE MANAGEMENT/SOCIAL WORK - PATIENT PORTAL LINK FT
You can access the FollowMyHealth Patient Portal offered by [facility] by registering at the following website: [email or web address]. By joining [facility]’s FollowMyHealth portal, you will also be able to view your health information using other applications (apps) compatible with our system.

## 2020-01-16 RX ORDER — OXYCODONE 10 MG/1
10 TABLET ORAL EVERY 8 HOURS
Qty: 21 | Refills: 0 | Status: ACTIVE | COMMUNITY
Start: 2019-12-24 | End: 1900-01-01

## 2020-01-17 ENCOUNTER — APPOINTMENT (OUTPATIENT)
Dept: SPINE | Facility: CLINIC | Age: 20
End: 2020-01-17
Payer: COMMERCIAL

## 2020-01-17 VITALS
SYSTOLIC BLOOD PRESSURE: 122 MMHG | WEIGHT: 206 LBS | TEMPERATURE: 98.3 F | DIASTOLIC BLOOD PRESSURE: 87 MMHG | BODY MASS INDEX: 38.89 KG/M2 | HEIGHT: 61 IN | RESPIRATION RATE: 16 BRPM | HEART RATE: 127 BPM | OXYGEN SATURATION: 96 %

## 2020-01-17 DIAGNOSIS — Z09 ENCOUNTER FOR FOLLOW-UP EXAMINATION AFTER COMPLETED TREATMENT FOR CONDITIONS OTHER THAN MALIGNANT NEOPLASM: ICD-10-CM

## 2020-01-17 PROCEDURE — 99024 POSTOP FOLLOW-UP VISIT: CPT

## 2020-01-17 NOTE — PHYSICAL EXAM
[General Appearance - Alert] : alert [General Appearance - In No Acute Distress] : in no acute distress [Transverse] : transverse [Healing Well] : healing well [Sutures Intact] : closed with intact sutures [No Drainage] : without drainage [Normal Skin] : normal [I: Normal Smell] : smell intact bilaterally [Cranial Nerves Trigeminal (V)] : facial sensation intact symmetrically [Cranial Nerves Oculomotor (III)] : extraocular motion intact [Cranial Nerves Optic (II)] : visual acuity intact bilaterally,  pupils equal round and reactive to light [Cranial Nerves Facial (VII)] : face symmetrical [Cranial Nerves Glossopharyngeal (IX)] : tongue and palate midline [Cranial Nerves Vestibulocochlear (VIII)] : hearing was intact bilaterally [Cranial Nerves Hypoglossal (XII)] : there was no tongue deviation with protrusion [Cranial Nerves Accessory (XI - Cranial And Spinal)] : head turning and shoulder shrug symmetric [Motor Tone] : muscle tone was normal in all four extremities [Motor Strength] : muscle strength was normal in all four extremities [Sclera] : the sclera and conjunctiva were normal [Outer Ear] : the ears and nose were normal in appearance [] : no respiratory distress [Abnormal Walk] : normal gait [Abdomen Soft] : soft [FreeTextEntry1] : Right LOWER LAFERAL BACK and LEFT  upper lateral back

## 2020-01-17 NOTE — REASON FOR VISIT
[de-identified] : 1/10/20 [de-identified] : S/PPercutaneous placement of left cervicothoracic spinal cord stimulator electrode array.\par  Percutaneous placement of right cervical spinal cord stimulator electrode array.\par  Placement of battery pulse generator.\par  Complex programming of battery pulse generator.\par \par  [de-identified] : Doing well today\par Incision healing well with Glue closure. No swelling , drainage or signs of infection. Wound edges approximated\par Will defer removal of sutures for a few more days.

## 2020-01-21 ENCOUNTER — APPOINTMENT (OUTPATIENT)
Dept: SPINE | Facility: CLINIC | Age: 20
End: 2020-01-21
Payer: COMMERCIAL

## 2020-01-21 VITALS
BODY MASS INDEX: 38.1 KG/M2 | RESPIRATION RATE: 16 BRPM | TEMPERATURE: 97.3 F | HEIGHT: 61 IN | HEART RATE: 119 BPM | SYSTOLIC BLOOD PRESSURE: 130 MMHG | DIASTOLIC BLOOD PRESSURE: 89 MMHG | WEIGHT: 201.8 LBS | OXYGEN SATURATION: 95 %

## 2020-01-21 PROCEDURE — 99213 OFFICE O/P EST LOW 20 MIN: CPT

## 2020-01-21 PROCEDURE — 95972 ALYS CPLX SP/PN NPGT W/PRGRM: CPT

## 2020-01-21 RX ORDER — CLINDAMYCIN HYDROCHLORIDE 150 MG/1
150 CAPSULE ORAL
Qty: 15 | Refills: 0 | Status: COMPLETED | COMMUNITY
Start: 2020-01-10

## 2020-01-21 RX ORDER — OXYCODONE AND ACETAMINOPHEN 10; 325 MG/1; MG/1
10-325 TABLET ORAL
Qty: 90 | Refills: 0 | Status: COMPLETED | COMMUNITY
Start: 2019-10-18

## 2020-01-21 RX ORDER — PANTOPRAZOLE 40 MG/1
40 TABLET, DELAYED RELEASE ORAL
Refills: 0 | Status: DISCONTINUED | COMMUNITY
End: 2020-01-21

## 2020-01-21 RX ORDER — OMEPRAZOLE 40 MG/1
40 CAPSULE, DELAYED RELEASE ORAL
Qty: 30 | Refills: 0 | Status: ACTIVE | COMMUNITY
Start: 2019-07-30

## 2020-01-21 RX ORDER — METFORMIN ER 500 MG 500 MG/1
500 TABLET ORAL
Qty: 60 | Refills: 0 | Status: DISCONTINUED | COMMUNITY
Start: 2018-08-27 | End: 2020-01-21

## 2020-01-21 RX ORDER — ALCLOMETASONE DIPROPIONATE 0.5 MG/G
0.05 OINTMENT TOPICAL
Qty: 45 | Refills: 0 | Status: DISCONTINUED | COMMUNITY
Start: 2019-01-18 | End: 2020-01-21

## 2020-01-21 RX ORDER — METHYLPHENIDATE HYDROCHLORIDE 36 MG/1
36 TABLET, EXTENDED RELEASE ORAL DAILY
Refills: 0 | Status: ACTIVE | COMMUNITY
Start: 2019-01-21

## 2020-01-21 RX ORDER — ONDANSETRON 4 MG/1
4 TABLET ORAL
Qty: 30 | Refills: 0 | Status: ACTIVE | COMMUNITY
Start: 2019-12-16

## 2020-01-21 RX ORDER — METFORMIN HYDROCHLORIDE 500 MG/1
500 TABLET, COATED ORAL
Refills: 0 | Status: DISCONTINUED | COMMUNITY
End: 2020-01-21

## 2020-01-21 RX ORDER — OXYCODONE AND ACETAMINOPHEN 5; 325 MG/1; MG/1
5-325 TABLET ORAL
Qty: 28 | Refills: 0 | Status: COMPLETED | COMMUNITY
Start: 2019-09-17

## 2020-01-21 RX ORDER — LURASIDONE HYDROCHLORIDE 60 MG/1
60 TABLET, FILM COATED ORAL
Qty: 30 | Refills: 0 | Status: DISCONTINUED | COMMUNITY
Start: 2018-06-25 | End: 2020-01-21

## 2020-01-21 RX ORDER — LURASIDONE HYDROCHLORIDE 80 MG/1
80 TABLET, FILM COATED ORAL
Refills: 0 | Status: ACTIVE | COMMUNITY

## 2020-01-21 RX ORDER — QUETIAPINE FUMARATE 25 MG/1
25 TABLET ORAL AT BEDTIME
Refills: 0 | Status: ACTIVE | COMMUNITY

## 2020-01-21 RX ORDER — CHOLECALCIFEROL (VITAMIN D3) 50 MCG
50 MCG TABLET ORAL
Qty: 30 | Refills: 0 | Status: COMPLETED | COMMUNITY
Start: 2019-08-02

## 2020-01-21 NOTE — HISTORY OF PRESENT ILLNESS
[FreeTextEntry1] : Carine Cole is a 19 year old woman who is s/p percutaneous placement of a left cervicothoracic spinal cord stimulator electrode array and placement of a right cervical spinal cord stimulator electrode array, placement of a [employer] battery pulse generator and complex programming of the battery pulse generator on 01/10/2020.  The patient has been having significant pain at the incision sites.  She also c/o tingling and pain in her fingertips.

## 2020-01-21 NOTE — REASON FOR VISIT
[Follow-Up: _____] : a [unfilled] follow-up visit [Parent] : parent [FreeTextEntry1] : The patient is here for suture removal and to meet with the [employer] representative for education on and complex programming of the stimulator.

## 2020-01-21 NOTE — PHYSICAL EXAM
[Clean] : clean [Dry] : dry [Intact] : intact [Healing Well] : healing well [Sutures Intact] : closed with intact sutures [Normal Skin] : normal [No Drainage] : without drainage [Erythema] : not erythematous [Normal Skin Turgor] : skin turgor was normal [Warm] : not warm [FreeTextEntry6] : There are two sutures at the bottom of the lumbar incision.  There is Dermabond over the incisions which has started to peel. [FreeTextEntry1] : Mid lumbar and left buttock [Place] : oriented to place [Person] : oriented to person [Time] : oriented to time [Remote Intact] : remote memory intact [Span Intact] : the attention span was normal [Short Term Intact] : short term memory intact [Fluency] : fluency intact [Concentration Intact] : normal concentrating ability [Comprehension] : comprehension intact [Current Events] : adequate knowledge of current events [Past History] : adequate knowledge of personal past history [Vocabulary] : adequate range of vocabulary [Cranial Nerves Optic (II)] : visual acuity intact bilaterally,  pupils equal round and reactive to light [Cranial Nerves Trigeminal (V)] : facial sensation intact symmetrically [Cranial Nerves Oculomotor (III)] : extraocular motion intact [Cranial Nerves Facial (VII)] : face symmetrical [Cranial Nerves Vestibulocochlear (VIII)] : hearing was intact bilaterally [Cranial Nerves Glossopharyngeal (IX)] : tongue and palate midline [Motor Tone] : muscle tone was normal in all four extremities [Cranial Nerves Accessory (XI - Cranial And Spinal)] : head turning and shoulder shrug symmetric [Cranial Nerves Hypoglossal (XII)] : there was no tongue deviation with protrusion [Motor Strength] : muscle strength was normal in all four extremities [No Muscle Atrophy] : normal bulk in all four extremities [Abnormal Walk] : normal gait [Balance] : balance was intact [Sensation Tactile Decrease] : light touch was intact [Past-pointing] : there was no past-pointing [Tremor] : no tremor present [2+] : Patella left 2+ [___] : absent on the right [Yi] : Yi's sign was not demonstrated [___] : absent on the left

## 2020-01-21 NOTE — ASSESSMENT
[FreeTextEntry1] : The sutures were removed.  It was explained that the Dermabond will eventually peel off.  Activity levels and proper body mechanics were discussed.  The patient met with the [employer] representative and had education about the stimulator.  The stimulator underwent complex programming making changes to the millivolt frequency and pulse width with good results of relieving the tingling of the patient's fingers bilaterally.  The patient is to return in one month to evaluate how she is feeling and for a wound check.

## 2020-03-02 ENCOUNTER — APPOINTMENT (OUTPATIENT)
Dept: SPINE | Facility: CLINIC | Age: 20
End: 2020-03-02
Payer: COMMERCIAL

## 2020-03-02 VITALS
OXYGEN SATURATION: 95 % | BODY MASS INDEX: 37.78 KG/M2 | RESPIRATION RATE: 19 BRPM | HEIGHT: 61 IN | SYSTOLIC BLOOD PRESSURE: 135 MMHG | TEMPERATURE: 97.9 F | HEART RATE: 123 BPM | WEIGHT: 200.1 LBS | DIASTOLIC BLOOD PRESSURE: 85 MMHG

## 2020-03-02 PROCEDURE — 99212 OFFICE O/P EST SF 10 MIN: CPT

## 2020-03-16 NOTE — ASU PATIENT PROFILE, ADULT - PATIENT'S HEIGHT AND WEIGHT RECORDED IN THE VITAL SIGNS FLOWSHEET
Well Child Assessment:  History was provided by the father. Sunny Joy lives with his mother, father and brother. Interval problems do not include caregiver stress, chronic stress at home, lack of social support or marital discord. Nutrition  Types of intake include cow's milk, cereals, eggs, fish, fruits, juices, meats and vegetables. Dental  The patient does not have a dental home. The patient brushes teeth regularly. The patient does not floss regularly. Last dental exam was more than a year ago. Elimination  Elimination problems do not include constipation, diarrhea or urinary symptoms. Behavioral  Behavioral issues do not include biting, hitting, lying frequently, misbehaving with peers, misbehaving with siblings or performing poorly at school. Disciplinary methods: none. Sleep  Average sleep duration is 10 hours. The patient does not snore. There are no sleep problems. Safety  There is smoking in the home (father - smokes socially). There is no gun in home. School  Current grade level is 3rd. Current school district is [employer]. There are no signs of learning disabilities (enrolled in ESL ([employer])). Child is doing well in school. Screening  Immunizations up-to-date: will review copy provided. There are no risk factors for hearing loss. There are no risk factors for anemia. There are no risk factors for dyslipidemia. Social  The caregiver enjoys the child. After school, the child is at home with a parent. Sibling interactions are good. History:  -Patient is accompanied by his father and younger brother (9years old), whom I have also seen today. The family has been living in [address] [place] [address] for over a year and moved from [place] where they have been living for 4 to 5 years. Both the patient and his brother have not seen a pediatrician in about 2 years according to the father.     Other:  -Father brought up issue with leg cramps during both at day and nighttime that yes

## 2021-05-06 ENCOUNTER — EMERGENCY (EMERGENCY)
Facility: HOSPITAL | Age: 21
LOS: 1 days | Discharge: HOME | End: 2021-05-06
Attending: EMERGENCY MEDICINE | Admitting: EMERGENCY MEDICINE
Payer: COMMERCIAL

## 2021-05-06 ENCOUNTER — RESULT REVIEW (OUTPATIENT)
Age: 21
End: 2021-05-06

## 2021-05-06 VITALS
SYSTOLIC BLOOD PRESSURE: 114 MMHG | HEART RATE: 92 BPM | RESPIRATION RATE: 16 BRPM | DIASTOLIC BLOOD PRESSURE: 71 MMHG | TEMPERATURE: 98 F | OXYGEN SATURATION: 97 % | HEIGHT: 60 IN

## 2021-05-06 DIAGNOSIS — Z98.89 OTHER SPECIFIED POSTPROCEDURAL STATES: Chronic | ICD-10-CM

## 2021-05-06 DIAGNOSIS — Z90.49 ACQUIRED ABSENCE OF OTHER SPECIFIED PARTS OF DIGESTIVE TRACT: Chronic | ICD-10-CM

## 2021-05-06 PROCEDURE — 99284 EMERGENCY DEPT VISIT MOD MDM: CPT

## 2021-05-06 NOTE — ED PROVIDER NOTE - CLINICAL SUMMARY MEDICAL DECISION MAKING FREE TEXT BOX
20 year old female with multiple medical problems, hx of lupus, presenting with mother for evaluation of cold sore to left lip crease. Pt previously on Valtrex out patient, but has a recurrence of lesion 2 days ago. Is awaiting out pt eval with oral pathology. Sent in by PCP for dental evaluation. Dental paged, evaluating pt at bedside. Pt likely stable for DC home to treat again with antivirals

## 2021-05-06 NOTE — ED PROVIDER NOTE - PSH
S/P cholecystectomy  2018  S/P foot surgery  LEFT  S/P insertion of spinal cord stimulator  2015, revision 2016, battery change 2019  S/P tonsillectomy and adenoidectomy

## 2021-05-06 NOTE — ED PROVIDER NOTE - ATTENDING CONTRIBUTION TO CARE
Attending Statement: I have personally seen and examined this patient. I have fully participated in the care of this patient. I have reviewed all pertinent clinical information, including history physical exam, plan and the Resident's note and agree except as noted  20 year old female with a pmhx of lupus, Crohn's, autism spectrum disorder, OCD, ADD, CRPS with chronic left and right foot and ankle pain s/p spinal cord stimulator placement, pw 3 months of pain of sore by left side. Pain when opening of mouth. Able to swallow and talk without difficulty. no fever/chills.  Treated with valtrex from PMD>mother states sent in to be seen by oral pathology  Vital signs noted. mmm. nontoxic female. no drooling. no stridor. talking in full sentences w mother at bedside. circular ulcer on the left side crease lip. no bleeding. no discharge. well appearing.  plan dental consult, re assess

## 2021-05-06 NOTE — ED PROVIDER NOTE - PROGRESS NOTE DETAILS
Marques Chris, PGY-1- Discussed plan with dental. Oral pathology at bedside evaluating pt now. Awaiting recs. Marques Chris, PGY-1- Discussed results of work up with patient. Patient agrees with plan to discharge home. All questions answered regarding plan. Strict return precautions given. Spoke with Dr Leroy, ([phone]) pt pediatrician, informed of dental recommendation.

## 2021-05-06 NOTE — PROGRESS NOTE ADULT - SUBJECTIVE AND OBJECTIVE BOX
Please refer to previous dental consult note for additional information.     *INTERVAL HPI/OVERNIGHT EVENTS:    MEDICATIONS  (STANDING):    MEDICATIONS  (PRN):      Allergies    adhesives (Rash)  penicillin (Hives; Rash)    Intolerances    Valium (Other)      Vital Signs Last 24 Hrs  T(C): 36.8 (06 May 2021 10:36), Max: 36.8 (06 May 2021 10:36)  T(F): 98.2 (06 May 2021 10:36), Max: 98.2 (06 May 2021 10:36)  HR: 92 (06 May 2021 10:36) (92 - 92)  BP: 114/71 (06 May 2021 10:36) (114/71 - 114/71)  BP(mean): --  RR: 16 (06 May 2021 10:36) (16 - 16)  SpO2: 97% (06 May 2021 10:36) (97% - 97%)    LABS:    CLINICAL EXAM: Patient presents with mom to ED after instruction by Oral pathology due to 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 Patient's mother notes that lesions are extending onto buccal mucosa and worsening in size and pain despite medications. Ulcerative lesions present on the oral commissures with largest lesion on left side. Intra ral examination also shows oral lesions on the left buccal mucosa. Patient's opening is limited and she has also been having trouble eating. Panoramic x ray taken. Dentition appear intact with no signs of pathology at this time. No signs of intra oral infection noted.     DENTAL RADIOGRAPHS: Panoramic x ray     ASSESSMENT: No dental findings at this time.    PLAN: oral pathology evaluation.     PROCEDURE: Limited exam and radiograph  Verbal consent given.    RECOMMENDATIONS:  1) oral pathology evaluation   2) F/U with outpatient dentist for comprehensive dental care upon discharge.  3) If swelling, fever, difficulty breathing/swallowing occurs, page Dental.     Corina Bravo DDS #[id number], Delvis Nicholson DDS #[id number]   Please refer to previous dental consult note for additional information.     *INTERVAL HPI/OVERNIGHT EVENTS:    MEDICATIONS  (STANDING):    MEDICATIONS  (PRN):      Allergies    adhesives (Rash)  penicillin (Hives; Rash)    Intolerances    Valium (Other)      Vital Signs Last 24 Hrs  T(C): 36.8 (06 May 2021 10:36), Max: 36.8 (06 May 2021 10:36)  T(F): 98.2 (06 May 2021 10:36), Max: 98.2 (06 May 2021 10:36)  HR: 92 (06 May 2021 10:36) (92 - 92)  BP: 114/71 (06 May 2021 10:36) (114/71 - 114/71)  BP(mean): --  RR: 16 (06 May 2021 10:36) (16 - 16)  SpO2: 97% (06 May 2021 10:36) (97% - 97%)    LABS:    CLINICAL EXAM: Patient presents with mom to ED after instruction by Oral pathology due to 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 Patient's mother notes that lesions are extending onto buccal mucosa and worsening in size and pain despite medications. Ulcerative lesions present on the oral commissures with largest lesion on left side. Intra ral examination also shows oral lesions on the left buccal mucosa. Patient's opening is limited and she has also been having trouble eating. Panoramic x ray taken. Dentition appear intact with no signs of pathology at this time. No signs of intra oral infection noted.     DENTAL RADIOGRAPHS: Panoramic x ray     ASSESSMENT: No dental findings at this time.    PLAN: oral pathology evaluation.     PROCEDURE: Limited exam and radiograph  Verbal consent given.    RECOMMENDATIONS:  1) Lotrimin 3qd   2) No valtrex  3) F/U with outpatient dentist for comprehensive dental care upon discharge.  4) If swelling, fever, difficulty breathing/swallowing occurs, page Dental.     Corina Bravo DDS #[id number], Delvis Nicholson DDS #[id number]   Please refer to previous dental consult note for additional information.     *INTERVAL HPI/OVERNIGHT EVENTS:    MEDICATIONS  (STANDING):    MEDICATIONS  (PRN):      Allergies    adhesives (Rash)  penicillin (Hives; Rash)    Intolerances    Valium (Other)      Vital Signs Last 24 Hrs  T(C): 36.8 (06 May 2021 10:36), Max: 36.8 (06 May 2021 10:36)  T(F): 98.2 (06 May 2021 10:36), Max: 98.2 (06 May 2021 10:36)  HR: 92 (06 May 2021 10:36) (92 - 92)  BP: 114/71 (06 May 2021 10:36) (114/71 - 114/71)  BP(mean): --  RR: 16 (06 May 2021 10:36) (16 - 16)  SpO2: 97% (06 May 2021 10:36) (97% - 97%)    LABS:    CLINICAL EXAM: Patient presents with mom to ED after instruction by physician who was suspicious of the lesion being HSV Patient's mother notes that lesions are extending onto buccal mucosa and worsening in size and pain despite medications. Lesions present on the oral commissures with largest lesion on left side. Intra oral examination also shows oral lesions on the left buccal mucosa and floor of mouth. Patient's opening is limited and she has also been having trouble eating. Panoramic x ray taken. Dentition appear intact with no signs of pathology at this time. No signs of intra oral infection noted.     DENTAL RADIOGRAPHS: Panoramic x ray     ASSESSMENT: No dental findings at this time.    PLAN: oral pathology evaluation scheduled in 2 weeks    PROCEDURE: Limited exam and radiograph  Verbal consent given.    RECOMMENDATIONS:  1) Lotrimin 3qd   2) No valtrex  3) F/U with outpatient dentist for comprehensive dental care upon discharge.  4) If swelling, fever, difficulty breathing/swallowing occurs, page Dental.     Corina Bravo DDS #[id number], Delvis Nicholson DDS #[id number]   Please refer to previous dental consult note for additional information.     *INTERVAL HPI/OVERNIGHT EVENTS:    MEDICATIONS  (STANDING):    MEDICATIONS  (PRN):      Allergies    adhesives (Rash)  penicillin (Hives; Rash)    Intolerances    Valium (Other)      Vital Signs Last 24 Hrs  T(C): 36.8 (06 May 2021 10:36), Max: 36.8 (06 May 2021 10:36)  T(F): 98.2 (06 May 2021 10:36), Max: 98.2 (06 May 2021 10:36)  HR: 92 (06 May 2021 10:36) (92 - 92)  BP: 114/71 (06 May 2021 10:36) (114/71 - 114/71)  BP(mean): --  RR: 16 (06 May 2021 10:36) (16 - 16)  SpO2: 97% (06 May 2021 10:36) (97% - 97%)    LABS:    CLINICAL EXAM: Patient presents with mom to ED after instruction by physician who was suspicious of the lesion being HSV Patient's mother notes that lesions are extending onto buccal mucosa and worsening in size and pain despite medications. Lesions present on the oral commissures with largest lesion on left side. Intra oral examination also shows oral lesions on the left buccal mucosa and floor of mouth. Patient's opening is limited and she has also been having trouble eating. Panoramic x ray taken. Dentition appear intact with no signs of pathology at this time. No signs of intra oral infection noted.     DENTAL RADIOGRAPHS: Panoramic x ray     ASSESSMENT: No dental findings at this time.    PLAN: oral pathology evaluation scheduled in 2 weeks    PROCEDURE: Limited exam and radiograph  Verbal consent given.    RECOMMENDATIONS:  1) OTC Lotrimin tid  2) No valtrex  3) F/U with outpatient dentist for comprehensive dental care upon discharge.  4) If swelling, fever, difficulty breathing/swallowing occurs, page Dental.     Corina Bravo DDS #[id number], Delvis Nicholson DDS #[id number]

## 2021-05-06 NOTE — ED PROVIDER NOTE - NSFOLLOWUPINSTRUCTIONS_ED_ALL_ED_FT
1) Follow up with your PCP within 1-3 days of being seen in the Emergency Department. Also follow up with oral pathologist as directed (May 17)  2) Return to the ED immediately for new or worsening symptoms unable to eat or drink, difficulty swallowing, voice changes, unable to open and close mouth, worsening of lesions   3) Please continue to take any home medications as prescribed  4) Your test results from your ED visit were discussed with you prior to discharge  5) You were provided with a copy of your test results  6) Please use the Lotrimin as directed 3 times a day until you see the oral pathologist in the office. The cream can be picked up at any pharmacy.

## 2021-05-06 NOTE — ED ADULT TRIAGE NOTE - CHIEF COMPLAINT QUOTE
Pt. c/o sores in her mouth, lip swelling and metallic taste x 3 months. States she's been on antibiotics but has had no improvement. Advised to come to ER. h/o aspergers, lupus

## 2021-05-06 NOTE — ED PROVIDER NOTE - OBJECTIVE STATEMENT
20 year old female with a pmhx of lupus, Crohn's, autism spectrum disorder, OCD, ADD, CRPS with chronic left and right foot and ankle pain s/p spinal cord stimulator placement, is sent to ED by PCP for evaluation of mouth sores x3 months, now worsening. 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 She was prescribed Valtrex, which she completed out patient. Patient reports 2 days of worsening sore on left crease of lips. She has difficulty opening her mouth due to pain, but is able to eat and drink soft foods. She does not have any difficulty swallowing. Per mother, her PCP Dr. Quiroz sent her to ED to be further evaluated. She was trying to be seen by Dr. Morrissey oral pathologist (has appt June 6), but they could not see her soon enough. Mother reports patient has not seen her own dentist. Patient has not had any fever, chills, cp, sob, cough, drooling, abd pain, diarrhea, or vomiting.

## 2021-05-06 NOTE — ED PROVIDER NOTE - PMH
ADD (attention deficit disorder)    ADHD (Attention Deficit Hyperactivity Disorder)    Ankle fracture  congenital  left ankle  Staller Walden fracture  Asperger's syndrome    Crohn disease    CRPS (complex regional pain syndrome), lower limb    Depression    Lupus (systemic lupus erythematosus)    Neuroma    OCD (Obsessive Compulsive Disorder)    PCOS (polycystic ovarian syndrome)  on metformin  PNA (pneumonia)  2018 w/ pneumothorax  RSD lower limb, left

## 2021-05-06 NOTE — ED PROVIDER NOTE - PHYSICAL EXAMINATION
General: well appearing, no acute distress, AOx3  Skin: normal color for race, no rash  Head: normocephalic, atraumatic  Eyes: clear conjunctiva, EOMI  ENMT: airway patent, no nasal discharge, sore to crease of left lip, ulcerated, no pharyngeal erythema or edema, handling secretions well   Cardiovascular: normal rate, normal rhythm, S1/S2  Pulmonary: clear to auscultation bilaterally, no rales, rhonchi, or wheeze  Abdomen: soft, nontender  Musculoskeletal: moving extremities well, no deformity  Psych: normal mood, normal affect

## 2021-05-06 NOTE — ED PROVIDER NOTE - NS ED ROS FT
General: no fever, no chills  Eyes: no vision changes, no eye pain  ENMT: +sore mouth, no difficulty swallowing   Cardiovascular: no chest pain, no edema  Respiratory: no cough, no shortness of breath  Gastrointestinal: no abdominal pain, no nausea, no vomiting, no diarrhea  Genitourinary: no dysuria, no hematuria  Musculoskeletal: no muscle pain, no joint pain  Skin: no rash, no lesions  Neuro: no numbness, no tingling  Psych: no depression, no anxiety

## 2021-05-06 NOTE — ED PROVIDER NOTE - PATIENT PORTAL LINK FT
You can access the FollowMyHealth Patient Portal offered by [facility] by registering at the following website: [email or web address]. By joining [employer]’s FollowMyHealth portal, you will also be able to view your health information using other applications (apps) compatible with our system.

## 2021-06-14 ENCOUNTER — NON-APPOINTMENT (OUTPATIENT)
Age: 21
End: 2021-06-14

## 2021-06-14 ENCOUNTER — APPOINTMENT (OUTPATIENT)
Dept: CARDIOLOGY | Facility: CLINIC | Age: 21
End: 2021-06-14
Payer: COMMERCIAL

## 2021-06-14 VITALS
WEIGHT: 193 LBS | RESPIRATION RATE: 16 BRPM | HEART RATE: 100 BPM | DIASTOLIC BLOOD PRESSURE: 80 MMHG | TEMPERATURE: 98.6 F | OXYGEN SATURATION: 94 % | SYSTOLIC BLOOD PRESSURE: 114 MMHG | HEIGHT: 60 IN | BODY MASS INDEX: 37.89 KG/M2

## 2021-06-14 PROCEDURE — 93000 ELECTROCARDIOGRAM COMPLETE: CPT

## 2021-06-14 PROCEDURE — 99204 OFFICE O/P NEW MOD 45 MIN: CPT

## 2021-06-14 NOTE — PHYSICAL EXAM
[Well Developed] : well developed [Well Nourished] : well nourished [No Acute Distress] : no acute distress [Normal Venous Pressure] : normal venous pressure [Normal S1, S2] : normal S1, S2 [No Murmur] : no murmur [Clear Lung Fields] : clear lung fields [No Respiratory Distress] : no respiratory distress  [Good Air Entry] : good air entry [Soft] : abdomen soft [Non Tender] : non-tender [Normal Gait] : normal gait [No Edema] : no edema [Moves all extremities] : moves all extremities [No Focal Deficits] : no focal deficits [Alert and Oriented] : alert and oriented

## 2021-06-14 NOTE — REVIEW OF SYSTEMS
[Weight Gain (___ Lbs)] : [unfilled] ~Ulb weight gain [Sore Throat] : sore throat [Chest Discomfort] : chest discomfort [Fever] : no fever [Headache] : no headache [Chills] : no chills [Feeling Fatigued] : not feeling fatigued [Blurry Vision] : no blurred vision [SOB] : no shortness of breath [Dyspnea on exertion] : not dyspnea during exertion [Lower Ext Edema] : no extremity edema [Syncope] : no syncope [Cough] : no cough [Wheezing] : no wheezing [Abdominal Pain] : no abdominal pain [Dizziness] : no dizziness [Confusion] : no confusion was observed [Easy Bleeding] : no tendency for easy bleeding [Easy Bruising] : no tendency for easy bruising

## 2021-06-14 NOTE — HISTORY OF PRESENT ILLNESS
[FreeTextEntry1] : 20F with SLE, hashimotos thyroiditis, crohns, complex regional pain syndrome, borderline autism spectrum d/o presents to establish cardiovascular care\par Sent in by: mother and father also my patients\par PMD:	\par \par pt's mother was at today's appt and present for the complete encounter and provided/confirmed medical hx and symptoms. \par \par \par Patient is TRANSGENDER and goes by the BELLO, and Preferred Pronoun is HE/HIS\par birth gender: female. \par \par \par \par pt states she had a upper respiratory viral infection (covid negative, RVP negative per pt) in 5/2021, and since then pt reports constant chest pain. pt states it is worse with inspiration. pt states it is not related to exertion. pt is on multiple medications, states no recent changes.  pain is worse when laying flat. \par \par pt denies palpitations, syncope. \par \par \par Exercise: none\par Diet: none\par Prior cardiac workup: none\par Recent labs: pt has [facility] pp on her phone with health portal 6/21: Cr 0.77 gfr 100 tsh 3.86 \par \par EKG: SR 98 bpm\par \par Med hx: cervical and thoracic spinal cord stimulator in place. SLE, Hashimoto's thyroiditis, Crohns, complex regional pain syndrome\par OBGYN hx: irregular menstrual cycles, +PCOS. \par Sx hx: sinal cord stimulator sx, foot sx x2, cholecystectomy, tonsils, adenoids.\par Fam hx: grandfather and uncles: CAD\par Social hx: lives in [place] with parents. does not work, on permanent disability,  denies TOB/ETOH/drugs\par Meds: aldactone 100 quetiapine 25 metformin latuda duloxetine cymbalta clonazepam hydroxychloroquine, omeprazol, risperdal hyosyomine vit D zinc. \par Allergies: diazepam \par

## 2021-06-14 NOTE — DISCUSSION/SUMMARY
[FreeTextEntry1] : 20F (transgender, identifies as male name:Aguilar) with SLE, hashimotos thyroiditis, crohns, complex regional pain syndrome, borderline autism spectrum d/o presents to establish cardiovascular care\par \par 1. atypical CP\par -unclear etiology, may represent post viral syndrome vs SLE flare vs complex regional pain flare\par -will check TTE to eval for structural abnormalities, pericardial dz\par -increasing exercise tolerance recommended\par -may require pulm eval for pleuritic cp, pt to consider it. \par \par f/u after testing

## 2021-06-23 ENCOUNTER — APPOINTMENT (OUTPATIENT)
Dept: CARDIOLOGY | Facility: CLINIC | Age: 21
End: 2021-06-23

## 2021-07-02 NOTE — ED ADULT TRIAGE NOTE - NS ED NURSE BANDS TYPE
Additional Information   Negative: Is this related to a work injury?  Negative: Is this related to an MVA?  Negative: Are you currently recieving homecare services?  Negative: Does the patient have a fever?  Negative: Has the patient had unexplained weight loss?  Negative: Is the patient experiencing blood in sputum?  Negative: Has the patient experienced major trauma? (fall from height, high speed collision, direct blow to spine) and is also experiencing nausea, light-headedness, or loss of consciousness?  Negative: Is the patient experiencing urine retention?  Affirmative: Is this a chronic condition? Background - Initial Assessment  Clinical complaint: right sided neck pain radiates to R shoulder down right arm  Date of onset:  Shoulder pain present for about a year to 1 1/2 yrs- Neck and arm for about 4 mths  Frequency of pain: constant  Quality of pain: dull ache, burning, shooting, stabbing,sharp  Protocols used:  AMB COMPREHENSIVE SPINE PROGRAM PROTOCOL    Patient LM for  Comprehensive spine program after Orthopedic appointment this morning  Nurse returned call to discuss needs and our program  Triage, referrals, and evaluations for PT, PM and ortho reviewed  Patient just finished sessions for adhesive capsulitis/ shoulder and stated it was "somewhat effective"  Evaluated this morning per pt  ( Providers visit notes are not completed for nurse to review)  Patient has already seen Spine & Pain (Dr Gonzalez Carrera) and is not interested in follow up for chronic and newer s/s  Nurse to call patient after note is completed and/or reach out to Ortho provider seen today for recommendations  Comprehensive spine is the only new referral entered today  Patient "fine with that"  Nurse informed him nurse would like to assist him in moving forward  Patient LM today (7/6/21) requesting CB  Nurse returned call and discussed pts current needs   Patient does not wish to schedule with PM for his neck   Nurse finished triage for referral to PT Spine for evaluation with advanced spine therapist   Patient declined call from [facility] d/t score  Nurse agreed and chart noted  Patient appreciative of CB  Name band;

## 2021-07-21 ENCOUNTER — APPOINTMENT (OUTPATIENT)
Dept: PULMONOLOGY | Facility: CLINIC | Age: 21
End: 2021-07-21
Payer: COMMERCIAL

## 2021-07-21 VITALS
SYSTOLIC BLOOD PRESSURE: 119 MMHG | HEART RATE: 112 BPM | BODY MASS INDEX: 32.1 KG/M2 | OXYGEN SATURATION: 98 % | TEMPERATURE: 97.8 F | WEIGHT: 188 LBS | DIASTOLIC BLOOD PRESSURE: 89 MMHG | HEIGHT: 64 IN

## 2021-07-21 DIAGNOSIS — R00.0 TACHYCARDIA, UNSPECIFIED: ICD-10-CM

## 2021-07-21 DIAGNOSIS — R07.89 OTHER CHEST PAIN: ICD-10-CM

## 2021-07-21 PROCEDURE — 94618 PULMONARY STRESS TESTING: CPT

## 2021-07-21 PROCEDURE — 99205 OFFICE O/P NEW HI 60 MIN: CPT | Mod: 25

## 2021-07-21 RX ORDER — CLONAZEPAM 2 MG/1
2 TABLET ORAL
Refills: 0 | Status: DISCONTINUED | COMMUNITY
End: 2021-07-21

## 2021-07-21 RX ORDER — ALBUTEROL SULFATE 90 UG/1
108 (90 BASE) INHALANT RESPIRATORY (INHALATION)
Qty: 1 | Refills: 3 | Status: ACTIVE | COMMUNITY
Start: 2021-07-21 | End: 1900-01-01

## 2021-07-21 NOTE — CONSULT LETTER
[Dear  ___] : Dear  [unfilled], [Consult Letter:] : I had the pleasure of evaluating your patient, [unfilled]. [Please see my note below.] : Please see my note below. [Consult Closing:] : Thank you very much for allowing me to participate in the care of this patient.  If you have any questions, please do not hesitate to contact me. [FreeTextEntry3] : Sincerely,\par \par Alisha Mulligan MD\par [facility] Physician [facility]\par Pulmonary Medicine\par tel: [phone]\par fax: [phone]\par

## 2021-07-21 NOTE — HISTORY OF PRESENT ILLNESS
[Never] : never [TextBox_4] : Mr BELLO COCHRAN (REBECCA) is a 20 year (transgender, identifies as male name:Bello)) with SLE, Hashimoto's thyroiditis, crohns, complex regional pain syndrome, borderline autism spectrum d/o presents presents for evaluation of pleruitic chest pain. \par Accompanied by mom. \par \par Patient reports that he Had viral infection 3-4 months ago - had cough, fever, minor chest pain (COVID negative per patient). Most sx resolved but he continued to have  mid sternal pleuritic chest discomfort that patient describes as sensation of burning. He denies cough, no wheezing, SOB. Pain is reproducible to palpation and is overall improving over the last several months. \par He does have SLE but no known hx of lung disease, there is ? hx of allergy induced asthma (was on ICS in elementary school). Was hospitalized with PNA in 2017. \par \par ROS: notable for chronic pain, perioral rash of unclear etiology, GI sx 2/2 Crohns\par \par \par PMH: cervical and thoracic spinal cord stimulator in place. SLE, Hashimoto's thyroiditis, Crohns, complex regional pain syndrome, PCOSs\par PSH: sinal cord stimulator sx, foot sx x2, cholecystectomy, tonsils, adenoids.\par Meds: Stelera;  aldactone 100 quetiapine 25 metformin latuda duloxetine cymbalta,  hydroxychloroquine, omeprazol, risperdal, hyosyomine vit D zinc. Methylphenidate\par All: diazepam \par SH: lives in [place] with parents. does not work, on permanent disability, denies TOB/ETOH/drugs\par FH: hx of DM, colorectal ca; maternal uncle  of MI, father had MI; grandfathers - pancreatic ca; hx of thyroid ca\par PMD: Dr Quiroz \par Immunizations: did not get COVID vaccine\par

## 2021-07-21 NOTE — ASSESSMENT
[FreeTextEntry1] : Mr MONSALVE (NITIN COCHRAN is a 20 year (transgender, identifies as male name:Dashawn)) with SLE, Hashimoto's thyroiditis, crohns, complex regional pain syndrome, borderline autism spectrum d/o presents presents for evaluation of pleuritic chest pain.\par \par #Pleuritic chest pain - started after apparent viral syndrome. Reproducible to palpation with no associated sx, overall improving. Likely MSK/costochondritis\par -- advised trial of Tylenol\par -- given hx of SLE and ?asthma - will obtain CXR and PFTs\par -- can check COVID Ab\par -- exercise oximetry today: O2 sats at rest: 98%; ; O2 sats with exertion on RA: 98%; . Patient felt a bit dizzy. \par -- will discuss exertional tachycardia with Dr Clinton\par \par All questions answered. Patient in agreement with plan. \par Follow up in 4-6w  or sooner if needed.\par

## 2021-07-21 NOTE — PHYSICAL EXAM
[No Acute Distress] : no acute distress [III] : Mallampati Class: III [Normal Appearance] : normal appearance [No Neck Mass] : no neck mass [Normal Rate/Rhythm] : normal rate/rhythm [Normal S1, S2] : normal s1, s2 [No Murmurs] : no murmurs [No Resp Distress] : no resp distress [Clear to Auscultation Bilaterally] : clear to auscultation bilaterally [No Abnormalities] : no abnormalities [Benign] : benign [No Clubbing] : no clubbing [No Cyanosis] : no cyanosis [No Edema] : no edema [FROM] : FROM [Normal Color/ Pigmentation] : normal color/ pigmentation [No Focal Deficits] : no focal deficits [Oriented x3] : oriented x3 [Normal Affect] : normal affect [TextBox_80] : pain reproducible with palpation over mid sternum [TextBox_99] : walks slowly 2/2 chronic LE issues

## 2021-08-05 ENCOUNTER — APPOINTMENT (OUTPATIENT)
Dept: PULMONOLOGY | Facility: CLINIC | Age: 21
End: 2021-08-05

## 2021-08-13 ENCOUNTER — APPOINTMENT (OUTPATIENT)
Dept: PULMONOLOGY | Facility: CLINIC | Age: 21
End: 2021-08-13

## 2021-08-30 NOTE — H&P PST ADULT - NEGATIVE CARDIOVASCULAR SYMPTOMS
no chest pain/no peripheral edema/no palpitations/no dyspnea on exertion I (Luis M) agree with above, I performed a history and physical. Counseled zane medical staff, physician assistant, and/or medical student on medical decision making as documented. Medical decisions and treatment interventions were made in real time during the patient encounter. Additionally and/or with the following exceptions: patient presented with shortness of breath and chest pain, vss, mildly dyspneic, work up significant for no PE, but has plueral effusion, emergent drainage non indicated. Admitted for smptomatic pleural effusion and IR drainage.

## 2022-01-01 NOTE — ASSESSMENT
[FreeTextEntry1] : Doing well Post SCS placement 1/10/20\par Suture removal deferred until 1/21/20\par Continue present plan of care\par Education provided regarding plan of care.\par  [FreeTextEntry2] : Jignesh Hodgson MD [FreeTextEntry3] : Kobe Mariano MD FAAP FACS\par Director, [facility]\par Division of Pediatric General, Thoracic and Endoscopic Surgery\par [facility]

## 2022-07-05 ENCOUNTER — APPOINTMENT (OUTPATIENT)
Dept: OBGYN | Facility: CLINIC | Age: 22
End: 2022-07-05

## 2022-07-05 VITALS
SYSTOLIC BLOOD PRESSURE: 111 MMHG | WEIGHT: 196 LBS | BODY MASS INDEX: 33.46 KG/M2 | HEIGHT: 64 IN | DIASTOLIC BLOOD PRESSURE: 76 MMHG

## 2022-07-05 DIAGNOSIS — N92.6 IRREGULAR MENSTRUATION, UNSPECIFIED: ICD-10-CM

## 2022-07-05 DIAGNOSIS — Z01.419 ENCOUNTER FOR GYNECOLOGICAL EXAMINATION (GENERAL) (ROUTINE) W/OUT ABNORMAL FINDINGS: ICD-10-CM

## 2022-07-05 PROCEDURE — 99213 OFFICE O/P EST LOW 20 MIN: CPT | Mod: 25

## 2022-07-05 PROCEDURE — 99385 PREV VISIT NEW AGE 18-39: CPT

## 2022-07-05 NOTE — HISTORY OF PRESENT ILLNESS
[FreeTextEntry1] : 22yo G0 LMP 2020 presents for annual GYN visit and irregular periods\par female to male transgender, he/him, ok with anatomic verbage vagina and breast\par first GYN visit\par Presents with mother\par \par states he has not had a period since 2020. Menarche at7yo with irregular periods until 2020. previously dx with PCOS. Patient states that he ultimately doesnot want his period however he wants to know why he has not had a period to make sure that he is healthy. Plans to persue hormonal treatment for transition, however is unable to have surgery due to nerve disease\par \par \par Menstrual Cycle:see above\par Sexual Activity: never\par Contraception:none\par Social/Mental Health:autistic lives with parents \par STD testing:none\par \par ROS: Denies fever/chills, HA, Cough/sore throat, CP, SOB, N/V, Diarrhea/Constipation, Pelvic pain, Urinary frequency/ urgency/ Incontinence, irregular vaginal bleeding, discharge or irritation\par \par Medical History\par OBhx:none\par GYNhx:none\par PMH:Autistic PCOS Gatito lupus hashimotos chronic regional pain syndrome ADHA, OCD, PTSD BIpolar depression, anxiety, oral facial granulomatosis\par Follows with GI, Lulu, PCP, Rikki rheumatologist, [facility] psychiatry, and therapist \par PSH:left foot 2-3 sx, sacral stim for nerve disease, tonsillectomy, collecystectomy\par Meds:no hormone or hormone blockers\par ALL: steri strips, NKDA, Diazepam- adverse reaction\par Social:none\par Famhx: moms paternal Gma, thyroid CA, pancreatic CA and DM both sides \par \par Preventative\par Physical Activity:disabled for nerve disease\par Diet:difficult to keep a healthy diet with Cron's\par \par \par

## 2022-07-05 NOTE — PLAN
[FreeTextEntry1] : Annual: pap and GC \par \par irregular periods: hormone panel and US ordered. TVUS and pelvic ordered patient to decide if he is able to tolerate TVUS \par \par \par GYN counseling: Patient instructed to call for Unusual Pelvic pain,  UTI symptoms, irregular vaginal bleeding/discharge- Patient verbalized agreement\par \par F/U: patient to follow up in one month\par \par \par

## 2022-07-06 LAB
C TRACH RRNA SPEC QL NAA+PROBE: NOT DETECTED
N GONORRHOEA RRNA SPEC QL NAA+PROBE: NOT DETECTED
SOURCE TP AMPLIFICATION: NORMAL

## 2022-07-07 ENCOUNTER — NON-APPOINTMENT (OUTPATIENT)
Age: 22
End: 2022-07-07

## 2022-07-07 LAB
FSH SERPL-MCNC: 5 IU/L
LH SERPL-ACNC: 2.8 IU/L
PROLACTIN SERPL-MCNC: 22.5 NG/ML
TSH SERPL-ACNC: 3.76 UIU/ML

## 2022-07-08 LAB
TESTOST FREE SERPL-MCNC: 2.8 PG/ML
TESTOST SERPL-MCNC: 17.6 NG/DL

## 2022-07-12 LAB
CYTOLOGY CVX/VAG DOC THIN PREP: NORMAL
DHEA-SULFATE, SERUM: 222 UG/DL

## 2022-07-25 ENCOUNTER — APPOINTMENT (OUTPATIENT)
Dept: OBGYN | Facility: CLINIC | Age: 22
End: 2022-07-25

## 2022-09-16 ENCOUNTER — APPOINTMENT (OUTPATIENT)
Dept: INTERNAL MEDICINE | Facility: CLINIC | Age: 22
End: 2022-09-16

## 2022-09-16 VITALS
SYSTOLIC BLOOD PRESSURE: 128 MMHG | BODY MASS INDEX: 35.87 KG/M2 | DIASTOLIC BLOOD PRESSURE: 85 MMHG | OXYGEN SATURATION: 95 % | WEIGHT: 190 LBS | HEART RATE: 115 BPM | TEMPERATURE: 98.1 F | HEIGHT: 61 IN

## 2022-09-16 DIAGNOSIS — G90.513 COMPLEX REGIONAL PAIN SYNDROME I OF UPPER LIMB, BILATERAL: ICD-10-CM

## 2022-09-16 DIAGNOSIS — F31.9 BIPOLAR DISORDER, UNSPECIFIED: ICD-10-CM

## 2022-09-16 DIAGNOSIS — F84.0 AUTISTIC DISORDER: ICD-10-CM

## 2022-09-16 DIAGNOSIS — K50.90 CROHN'S DISEASE, UNSPECIFIED, W/OUT COMPLICATIONS: ICD-10-CM

## 2022-09-16 DIAGNOSIS — M32.9 SYSTEMIC LUPUS ERYTHEMATOSUS, UNSPECIFIED: ICD-10-CM

## 2022-09-16 DIAGNOSIS — G90.522 COMPLEX REGIONAL PAIN SYNDROME I OF LEFT LOWER LIMB: ICD-10-CM

## 2022-09-16 DIAGNOSIS — Z87.42 PERSONAL HISTORY OF OTHER DISEASES OF THE FEMALE GENITAL TRACT: ICD-10-CM

## 2022-09-16 PROCEDURE — 99385 PREV VISIT NEW AGE 18-39: CPT

## 2022-09-16 RX ORDER — METHOTREXATE 2.5 MG/1
TABLET ORAL
Refills: 0 | Status: ACTIVE | COMMUNITY

## 2022-09-16 RX ORDER — HYDROXYCHLOROQUINE SULFATE 200 MG/1
200 TABLET ORAL
Refills: 0 | Status: ACTIVE | COMMUNITY

## 2022-09-16 RX ORDER — ADALIMUMAB 20MG/0.4ML
KIT SUBCUTANEOUS
Refills: 0 | Status: DISCONTINUED | COMMUNITY
End: 2022-09-16

## 2022-09-16 RX ORDER — RISPERIDONE 0.5 MG/1
TABLET, FILM COATED ORAL
Refills: 0 | Status: ACTIVE | COMMUNITY

## 2022-09-16 RX ORDER — ALPRAZOLAM 2 MG/1
TABLET ORAL
Refills: 0 | Status: ACTIVE | COMMUNITY

## 2022-09-16 RX ORDER — BREXPIPRAZOLE 1 MG/1
1 TABLET ORAL
Refills: 0 | Status: ACTIVE | COMMUNITY

## 2022-09-16 NOTE — HEALTH RISK ASSESSMENT
[Patient reported colonoscopy was normal] : Patient reported colonoscopy was normal [ColonoscopyDate] : 2/22

## 2022-09-16 NOTE — ASSESSMENT
[FreeTextEntry1] : given lab slip for labs-next blood draw\par spec f/u\par not planning on gender transition surgery due to concern about exacerbating CRPS

## 2022-09-16 NOTE — HISTORY OF PRESENT ILLNESS
[Parent] : parent [FreeTextEntry1] : cpx--establish\par addressed as Dashawn [de-identified] : sees gi for crohns since age 11--on cruz\par sees rheum for lupus (steve)\par psychiatry/psychology\par endo--Liseberger--pcos\par has post traumatic complex regional pain syndrome with all 4 extremities with implantable device--sees pain management ([facility])-prn oxy 2-3 times month\par anticipating hormonal tx for gender transition\par not covid vacced--advised by her spec MD's not to\par meds added and adjusted

## 2022-09-20 ENCOUNTER — APPOINTMENT (OUTPATIENT)
Dept: OBGYN | Facility: CLINIC | Age: 22
End: 2022-09-20

## 2022-09-20 ENCOUNTER — ASOB RESULT (OUTPATIENT)
Age: 22
End: 2022-09-20

## 2022-09-20 PROCEDURE — 76856 US EXAM PELVIC COMPLETE: CPT

## 2022-09-21 ENCOUNTER — NON-APPOINTMENT (OUTPATIENT)
Age: 22
End: 2022-09-21

## 2022-10-21 ENCOUNTER — APPOINTMENT (OUTPATIENT)
Dept: OBGYN | Facility: CLINIC | Age: 22
End: 2022-10-21

## 2022-11-01 ENCOUNTER — APPOINTMENT (OUTPATIENT)
Dept: INTERNAL MEDICINE | Facility: CLINIC | Age: 22
End: 2022-11-01

## 2022-11-01 VITALS
WEIGHT: 196 LBS | HEART RATE: 121 BPM | SYSTOLIC BLOOD PRESSURE: 113 MMHG | HEIGHT: 61 IN | DIASTOLIC BLOOD PRESSURE: 76 MMHG | TEMPERATURE: 98.6 F | OXYGEN SATURATION: 95 % | BODY MASS INDEX: 37 KG/M2

## 2022-11-01 DIAGNOSIS — R05.9 COUGH, UNSPECIFIED: ICD-10-CM

## 2022-11-01 DIAGNOSIS — J06.9 ACUTE UPPER RESPIRATORY INFECTION, UNSPECIFIED: ICD-10-CM

## 2022-11-01 PROCEDURE — 99213 OFFICE O/P EST LOW 20 MIN: CPT

## 2022-11-01 RX ORDER — BENZONATATE 100 MG/1
100 CAPSULE ORAL 3 TIMES DAILY
Qty: 21 | Refills: 0 | Status: ACTIVE | COMMUNITY
Start: 2022-11-01 | End: 1900-01-01

## 2022-11-01 RX ORDER — AZITHROMYCIN 250 MG/1
250 TABLET, FILM COATED ORAL
Qty: 1 | Refills: 0 | Status: ACTIVE | COMMUNITY
Start: 2022-11-01 | End: 1900-01-01

## 2022-11-01 NOTE — ASSESSMENT
[FreeTextEntry1] : zpack to start 48hrs if not better(pt states abx don’t affect her IBD)\par tessalon

## 2022-11-01 NOTE — HISTORY OF PRESENT ILLNESS
[Parent] : parent [FreeTextEntry1] : uri [de-identified] : 3 wls uri symps--no fever\par tested covid neg 3 wks ago--not vacced\par cough and productive sputum persist\par using nyquil\par same meds

## 2022-11-01 NOTE — PHYSICAL EXAM
[Normal TMs] : both tympanic membranes were normal [Normal] : soft, non-tender, non-distended, no masses palpated, no HSM and normal bowel sounds

## 2022-12-15 NOTE — H&P PST ADULT - NS ABD PE RECTAL EXAM
RN discussed lifestyle modifications to lower triglycerides including healthier foods, less fatty foods, exercising regularly. Patient states she has made these changes including foods low in saturated fats.     Patient looking for guidance on how to lower triglycerides.     Patient states she went to ED 12/9/22 for RUQ abdominal pain. Patient states she continues to have the pain. Patient's Hepatic Function Panel came back WNL. Lipase WNL. Lipid panel was not completed. Patient had US completed 12/9/22:   FINDINGS:     Gallbladder: Cholecystectomy.     Common bile duct:  6 mm.     Liver:  Heterogeneous echotexture throughout the liver parenchyma with  increased echogenicity suggestive of diffuse fatty infiltration.     Pancreas:  Unremarkable     Right kidney:  12.4 cm.  Cortical medullary differentiation pattern is  preserved. No evidence for obstructive hydroureteronephrosis. 1 cm cyst  lateral RIGHT kidney.     Miscellaneous:  None        IMPRESSION:     Fatty infiltration of liver.     1 cm cyst lateral RIGHT kidney.     No significant intra or extrahepatic biliary dilatation.      Patient is experiencing regular bowel movements, nothing red or black in stool. No nausea, vomiting or acid reflux. Spoke with patient on when to seek care in ED. Patient agreed and verbalized understanding. No further questions or concerns.       Please advise on lowering triglycerides and RUQ pain.       not examined

## 2022-12-29 ENCOUNTER — NON-APPOINTMENT (OUTPATIENT)
Age: 22
End: 2022-12-29

## 2023-01-02 ENCOUNTER — NON-APPOINTMENT (OUTPATIENT)
Age: 23
End: 2023-01-02

## 2023-06-22 ENCOUNTER — APPOINTMENT (OUTPATIENT)
Dept: INTERNAL MEDICINE | Facility: CLINIC | Age: 23
End: 2023-06-22
Payer: COMMERCIAL

## 2023-06-22 VITALS
OXYGEN SATURATION: 97 % | BODY MASS INDEX: 39.46 KG/M2 | WEIGHT: 209 LBS | TEMPERATURE: 98.1 F | DIASTOLIC BLOOD PRESSURE: 84 MMHG | HEART RATE: 98 BPM | SYSTOLIC BLOOD PRESSURE: 121 MMHG | HEIGHT: 61 IN

## 2023-06-22 DIAGNOSIS — M54.9 DORSALGIA, UNSPECIFIED: ICD-10-CM

## 2023-06-22 DIAGNOSIS — M62.838 OTHER MUSCLE SPASM: ICD-10-CM

## 2023-06-22 PROCEDURE — 99213 OFFICE O/P EST LOW 20 MIN: CPT

## 2023-06-22 NOTE — ASSESSMENT
[FreeTextEntry1] : topical rxn\par cont with voltaren\par inform provider if not improving 3-4  days

## 2023-06-22 NOTE — HISTORY OF PRESENT ILLNESS
[Parent] : parent [FreeTextEntry1] : back pain [de-identified] : pulled left lower back out yesterday while bending\par no radicular symps\par tried voltaren and topical rxn--improved today\par stable meds

## 2023-06-22 NOTE — PHYSICAL EXAM
[Normal] : normal rate, regular rhythm, normal S1 and S2 and no murmur heard [No Focal Deficits] : no focal deficits [de-identified] : local left paralumbar pain pain

## 2023-08-28 ENCOUNTER — NON-APPOINTMENT (OUTPATIENT)
Age: 23
End: 2023-08-28

## 2024-05-06 ENCOUNTER — APPOINTMENT (OUTPATIENT)
Dept: INTERNAL MEDICINE | Facility: CLINIC | Age: 24
End: 2024-05-06
Payer: MEDICAID

## 2024-05-06 VITALS
HEART RATE: 112 BPM | WEIGHT: 205.31 LBS | BODY MASS INDEX: 38.76 KG/M2 | HEIGHT: 61 IN | TEMPERATURE: 98.7 F | OXYGEN SATURATION: 94 % | DIASTOLIC BLOOD PRESSURE: 84 MMHG | SYSTOLIC BLOOD PRESSURE: 127 MMHG

## 2024-05-06 DIAGNOSIS — F32.A ANXIETY DISORDER, UNSPECIFIED: ICD-10-CM

## 2024-05-06 DIAGNOSIS — R21 RASH AND OTHER NONSPECIFIC SKIN ERUPTION: ICD-10-CM

## 2024-05-06 DIAGNOSIS — F41.9 ANXIETY DISORDER, UNSPECIFIED: ICD-10-CM

## 2024-05-06 PROCEDURE — G2211 COMPLEX E/M VISIT ADD ON: CPT | Mod: NC,1L

## 2024-05-06 PROCEDURE — 99213 OFFICE O/P EST LOW 20 MIN: CPT

## 2024-05-06 RX ORDER — DOXYCYCLINE HYCLATE 100 MG/1
100 TABLET ORAL TWICE DAILY
Qty: 14 | Refills: 0 | Status: ACTIVE | COMMUNITY
Start: 2024-05-06 | End: 1900-01-01

## 2024-05-06 NOTE — PHYSICAL EXAM
[Normal] : normal rate, regular rhythm, normal S1 and S2 and no murmur heard [de-identified] :  flat erythematous rash in right axilla--local raised area approx 1 cm in left axilla

## 2024-05-06 NOTE — HISTORY OF PRESENT ILLNESS
[FreeTextEntry1] : rash [de-identified] : lesion in right and left axilla--concerned about staph (has had prior) same meds

## 2024-05-17 DIAGNOSIS — B37.9 CANDIDIASIS, UNSPECIFIED: ICD-10-CM

## 2024-05-17 RX ORDER — FLUCONAZOLE 150 MG/1
150 TABLET ORAL
Qty: 1 | Refills: 0 | Status: ACTIVE | COMMUNITY
Start: 2024-05-17 | End: 1900-01-01

## 2024-12-15 NOTE — H&P PST ADULT - SKIN
DVT PPX: on Heparin gtt  Diet: Regular       Dietician/ Nutritionist Eval.   OOBTC, ambulate as tolerated. Fall precautions. Aspiration precautions. Delirium prevention measures, Promote sleep hygiene. Incentive spirometry.   # Dispo: pending clinical improvement, anticipate discharge back to home, F/U CM.  # Med recs done per notes from [facility], patient arrived on heparin gtt. detailed exam color normal/warm and dry

## 2025-04-30 NOTE — H&P PST ADULT - NS PRO LAST MENSTRUAL DATE
Scribe Attestation (For Scribes USE Only)... Attending Attestation (For Attendings USE Only).../Scribe Attestation (For Scribes USE Only)... LMP 1/15/19 1/15/19